# Patient Record
Sex: MALE | Race: WHITE | NOT HISPANIC OR LATINO | Employment: FULL TIME | URBAN - METROPOLITAN AREA
[De-identification: names, ages, dates, MRNs, and addresses within clinical notes are randomized per-mention and may not be internally consistent; named-entity substitution may affect disease eponyms.]

---

## 2021-06-28 DIAGNOSIS — G47.33 OSA (OBSTRUCTIVE SLEEP APNEA): Primary | ICD-10-CM

## 2021-07-05 ENCOUNTER — PROCEDURE VISIT (OUTPATIENT)
Dept: SLEEP MEDICINE | Facility: HOSPITAL | Age: 61
End: 2021-07-05
Attending: INTERNAL MEDICINE
Payer: OTHER GOVERNMENT

## 2021-07-05 DIAGNOSIS — G47.33 OSA (OBSTRUCTIVE SLEEP APNEA): ICD-10-CM

## 2021-07-05 PROCEDURE — 95806 SLEEP STUDY UNATT&RESP EFFT: CPT

## 2021-10-05 ENCOUNTER — PATIENT MESSAGE (OUTPATIENT)
Dept: ADMINISTRATIVE | Facility: HOSPITAL | Age: 61
End: 2021-10-05

## 2021-10-29 ENCOUNTER — OFFICE VISIT (OUTPATIENT)
Dept: FAMILY MEDICINE | Facility: CLINIC | Age: 61
End: 2021-10-29
Payer: COMMERCIAL

## 2021-10-29 VITALS
RESPIRATION RATE: 20 BRPM | DIASTOLIC BLOOD PRESSURE: 84 MMHG | HEART RATE: 78 BPM | BODY MASS INDEX: 31.78 KG/M2 | TEMPERATURE: 98 F | SYSTOLIC BLOOD PRESSURE: 136 MMHG | OXYGEN SATURATION: 99 % | WEIGHT: 227 LBS | HEIGHT: 71 IN

## 2021-10-29 DIAGNOSIS — M25.522 LEFT ELBOW PAIN: ICD-10-CM

## 2021-10-29 DIAGNOSIS — I10 ESSENTIAL HYPERTENSION: Primary | ICD-10-CM

## 2021-10-29 DIAGNOSIS — J30.9 ALLERGIC RHINITIS, UNSPECIFIED SEASONALITY, UNSPECIFIED TRIGGER: ICD-10-CM

## 2021-10-29 DIAGNOSIS — L57.0 ACTINIC KERATOSIS: ICD-10-CM

## 2021-10-29 DIAGNOSIS — G56.22 NEUROPATHY OF LEFT ULNAR NERVE AT WRIST: ICD-10-CM

## 2021-10-29 DIAGNOSIS — H91.90 HEARING LOSS, UNSPECIFIED HEARING LOSS TYPE, UNSPECIFIED LATERALITY: ICD-10-CM

## 2021-10-29 DIAGNOSIS — M19.90 OSTEOARTHRITIS, UNSPECIFIED OSTEOARTHRITIS TYPE, UNSPECIFIED SITE: ICD-10-CM

## 2021-10-29 DIAGNOSIS — K21.9 GASTROESOPHAGEAL REFLUX DISEASE, UNSPECIFIED WHETHER ESOPHAGITIS PRESENT: ICD-10-CM

## 2021-10-29 DIAGNOSIS — M25.561 ARTHRALGIA OF RIGHT KNEE: ICD-10-CM

## 2021-10-29 PROCEDURE — 90472 ZOSTER RECOMBINANT VACCINE: ICD-10-PCS | Mod: S$GLB,,, | Performed by: FAMILY MEDICINE

## 2021-10-29 PROCEDURE — 90471 FLU VACCINE (QUAD) GREATER THAN OR EQUAL TO 3YO PRESERVATIVE FREE IM: ICD-10-PCS | Mod: S$GLB,,, | Performed by: FAMILY MEDICINE

## 2021-10-29 PROCEDURE — 90472 IMMUNIZATION ADMIN EACH ADD: CPT | Mod: S$GLB,,, | Performed by: FAMILY MEDICINE

## 2021-10-29 PROCEDURE — 90750 ZOSTER RECOMBINANT VACCINE: ICD-10-PCS | Mod: S$GLB,,, | Performed by: FAMILY MEDICINE

## 2021-10-29 PROCEDURE — 90750 HZV VACC RECOMBINANT IM: CPT | Mod: S$GLB,,, | Performed by: FAMILY MEDICINE

## 2021-10-29 PROCEDURE — 90471 IMMUNIZATION ADMIN: CPT | Mod: S$GLB,,, | Performed by: FAMILY MEDICINE

## 2021-10-29 PROCEDURE — 90686 FLU VACCINE (QUAD) GREATER THAN OR EQUAL TO 3YO PRESERVATIVE FREE IM: ICD-10-PCS | Mod: S$GLB,,, | Performed by: FAMILY MEDICINE

## 2021-10-29 PROCEDURE — 99214 OFFICE O/P EST MOD 30 MIN: CPT | Mod: 25,S$GLB,, | Performed by: FAMILY MEDICINE

## 2021-10-29 PROCEDURE — 90686 IIV4 VACC NO PRSV 0.5 ML IM: CPT | Mod: S$GLB,,, | Performed by: FAMILY MEDICINE

## 2021-10-29 PROCEDURE — 99214 PR OFFICE/OUTPT VISIT, EST, LEVL IV, 30-39 MIN: ICD-10-PCS | Mod: 25,S$GLB,, | Performed by: FAMILY MEDICINE

## 2021-10-29 RX ORDER — LISINOPRIL 20 MG/1
20 TABLET ORAL DAILY
COMMUNITY
Start: 2021-09-13 | End: 2022-02-11 | Stop reason: SDUPTHER

## 2021-10-29 RX ORDER — FLUTICASONE PROPIONATE 50 MCG
1 SPRAY, SUSPENSION (ML) NASAL DAILY
COMMUNITY
Start: 2021-10-01

## 2021-11-01 PROBLEM — K21.9 GASTROESOPHAGEAL REFLUX DISEASE: Status: ACTIVE | Noted: 2021-11-01

## 2021-11-01 PROBLEM — L57.0 ACTINIC KERATOSIS: Status: ACTIVE | Noted: 2021-11-01

## 2021-11-01 PROBLEM — H91.90 HEARING LOSS: Status: ACTIVE | Noted: 2021-11-01

## 2021-11-01 PROBLEM — M19.90 OSTEOARTHRITIS: Status: ACTIVE | Noted: 2021-11-01

## 2021-11-01 PROBLEM — I10 ESSENTIAL HYPERTENSION: Status: ACTIVE | Noted: 2021-11-01

## 2021-11-05 ENCOUNTER — PATIENT OUTREACH (OUTPATIENT)
Dept: ADMINISTRATIVE | Facility: HOSPITAL | Age: 61
End: 2021-11-05
Payer: OTHER GOVERNMENT

## 2021-11-08 ENCOUNTER — PATIENT OUTREACH (OUTPATIENT)
Dept: ADMINISTRATIVE | Facility: HOSPITAL | Age: 61
End: 2021-11-08
Payer: OTHER GOVERNMENT

## 2022-01-03 ENCOUNTER — PATIENT MESSAGE (OUTPATIENT)
Dept: FAMILY MEDICINE | Facility: CLINIC | Age: 62
End: 2022-01-03
Payer: OTHER GOVERNMENT

## 2022-01-04 PROCEDURE — 90750 HZV VACC RECOMBINANT IM: CPT | Mod: S$GLB,,, | Performed by: FAMILY MEDICINE

## 2022-01-04 PROCEDURE — 90471 ZOSTER RECOMBINANT VACCINE: ICD-10-PCS | Mod: S$GLB,,, | Performed by: FAMILY MEDICINE

## 2022-01-04 PROCEDURE — 90750 ZOSTER RECOMBINANT VACCINE: ICD-10-PCS | Mod: S$GLB,,, | Performed by: FAMILY MEDICINE

## 2022-01-04 PROCEDURE — 90471 IMMUNIZATION ADMIN: CPT | Mod: S$GLB,,, | Performed by: FAMILY MEDICINE

## 2022-02-11 ENCOUNTER — OFFICE VISIT (OUTPATIENT)
Dept: FAMILY MEDICINE | Facility: CLINIC | Age: 62
End: 2022-02-11
Payer: OTHER GOVERNMENT

## 2022-02-11 VITALS
TEMPERATURE: 99 F | HEIGHT: 71 IN | WEIGHT: 236 LBS | BODY MASS INDEX: 33.04 KG/M2 | OXYGEN SATURATION: 95 % | HEART RATE: 68 BPM

## 2022-02-11 DIAGNOSIS — Z13.220 SCREENING CHOLESTEROL LEVEL: ICD-10-CM

## 2022-02-11 DIAGNOSIS — G56.22 NEUROPATHY OF LEFT ULNAR NERVE AT WRIST: ICD-10-CM

## 2022-02-11 DIAGNOSIS — Z72.0 TOBACCO USE: ICD-10-CM

## 2022-02-11 DIAGNOSIS — I10 ESSENTIAL HYPERTENSION: Primary | ICD-10-CM

## 2022-02-11 DIAGNOSIS — K21.9 GASTROESOPHAGEAL REFLUX DISEASE, UNSPECIFIED WHETHER ESOPHAGITIS PRESENT: ICD-10-CM

## 2022-02-11 DIAGNOSIS — Z12.5 SCREENING PSA (PROSTATE SPECIFIC ANTIGEN): ICD-10-CM

## 2022-02-11 DIAGNOSIS — H91.90 HEARING LOSS, UNSPECIFIED HEARING LOSS TYPE, UNSPECIFIED LATERALITY: ICD-10-CM

## 2022-02-11 PROCEDURE — 90732 PPSV23 VACC 2 YRS+ SUBQ/IM: CPT | Mod: S$GLB,,, | Performed by: FAMILY MEDICINE

## 2022-02-11 PROCEDURE — 90732 PNEUMOCOCCAL POLYSACCHARIDE VACCINE 23-VALENT =>2YO SQ IM: ICD-10-PCS | Mod: S$GLB,,, | Performed by: FAMILY MEDICINE

## 2022-02-11 PROCEDURE — 99214 PR OFFICE/OUTPT VISIT, EST, LEVL IV, 30-39 MIN: ICD-10-PCS | Mod: 25,S$GLB,, | Performed by: FAMILY MEDICINE

## 2022-02-11 PROCEDURE — 90471 IMMUNIZATION ADMIN: CPT | Mod: S$GLB,,, | Performed by: FAMILY MEDICINE

## 2022-02-11 PROCEDURE — 90471 PNEUMOCOCCAL POLYSACCHARIDE VACCINE 23-VALENT =>2YO SQ IM: ICD-10-PCS | Mod: S$GLB,,, | Performed by: FAMILY MEDICINE

## 2022-02-11 PROCEDURE — 99214 OFFICE O/P EST MOD 30 MIN: CPT | Mod: 25,S$GLB,, | Performed by: FAMILY MEDICINE

## 2022-02-11 RX ORDER — OMEPRAZOLE 20 MG/1
20 CAPSULE, DELAYED RELEASE ORAL DAILY
COMMUNITY
Start: 2021-08-18 | End: 2022-02-21

## 2022-02-11 RX ORDER — LISINOPRIL 40 MG/1
40 TABLET ORAL DAILY
Qty: 90 TABLET | Refills: 1 | Status: SHIPPED | OUTPATIENT
Start: 2022-02-11 | End: 2022-08-07

## 2022-02-11 RX ORDER — CELECOXIB 100 MG/1
1 CAPSULE ORAL 2 TIMES DAILY
COMMUNITY
Start: 2021-09-07 | End: 2022-02-11

## 2022-02-13 NOTE — PROGRESS NOTES
Not sleeping well.  Blood pressure has been under good control.  But had increased some about 7 days ago.  Using ibuprofen and Tylenol p.m..  Goes to [facility].  gastroesophageal reflux disease medicine p.r.n..  Tobacco use.  Has decreased to 1/2 pack per day now.  Off nonsteroidals.  Left ulnar neuropathy.  Had nerve conduction studies done at the [facility].  left 5th finger stays numb.  Sent him to physical therapy.  Has not really done any good.    Physical examination vital signs noted.  Blood pressure by me 146/72.  Neck without bruit.  Chest clear.  Heart regular rate rhythm.  Abdomen bowel sounds are positive soft and nontender.  Extremities without edema positive pulses.  Paresthesias elder distribution left upper extremity left 5th finger.    Impression.  Tobacco use.  Gastroesophageal reflux disease.  Hypertension.  Insomnia.  Left ulnar neuropathy.    Plan PSA lipid CBC CMP.  Pneumovax 23 administered.  Blood pressure follow-up in 2-3 weeks.  Increase his lisinopril from 20 mg to 40 mg 90 with a refill.  Referred him to orthopedics with the nerve conduction study results to consider having an ulnar release.

## 2022-02-17 LAB
ALBUMIN SERPL-MCNC: 4.4 G/DL (ref 3.6–5.1)
ALBUMIN/GLOB SERPL: 2 (CALC) (ref 1–2.5)
ALP SERPL-CCNC: 60 U/L (ref 35–144)
ALT SERPL-CCNC: 19 U/L (ref 9–46)
AST SERPL-CCNC: 18 U/L (ref 10–35)
BASOPHILS # BLD AUTO: 30 CELLS/UL (ref 0–200)
BASOPHILS NFR BLD AUTO: 0.6 %
BILIRUB SERPL-MCNC: 0.4 MG/DL (ref 0.2–1.2)
BUN SERPL-MCNC: 10 MG/DL (ref 7–25)
BUN/CREAT SERPL: ABNORMAL (CALC) (ref 6–22)
CALCIUM SERPL-MCNC: 9.1 MG/DL (ref 8.6–10.3)
CHLORIDE SERPL-SCNC: 104 MMOL/L (ref 98–110)
CHOLEST SERPL-MCNC: 185 MG/DL
CHOLEST/HDLC SERPL: 4.6 (CALC)
CO2 SERPL-SCNC: 27 MMOL/L (ref 20–32)
CREAT SERPL-MCNC: 0.85 MG/DL (ref 0.7–1.25)
EOSINOPHIL # BLD AUTO: 100 CELLS/UL (ref 15–500)
EOSINOPHIL NFR BLD AUTO: 2 %
ERYTHROCYTE [DISTWIDTH] IN BLOOD BY AUTOMATED COUNT: 12.7 % (ref 11–15)
GLOBULIN SER CALC-MCNC: 2.2 G/DL (CALC) (ref 1.9–3.7)
GLUCOSE SERPL-MCNC: 103 MG/DL (ref 65–99)
HCT VFR BLD AUTO: 41.1 % (ref 38.5–50)
HDLC SERPL-MCNC: 40 MG/DL
HGB BLD-MCNC: 13.6 G/DL (ref 13.2–17.1)
LDLC SERPL CALC-MCNC: 125 MG/DL (CALC)
LYMPHOCYTES # BLD AUTO: 1645 CELLS/UL (ref 850–3900)
LYMPHOCYTES NFR BLD AUTO: 32.9 %
MCH RBC QN AUTO: 30.4 PG (ref 27–33)
MCHC RBC AUTO-ENTMCNC: 33.1 G/DL (ref 32–36)
MCV RBC AUTO: 91.7 FL (ref 80–100)
MONOCYTES # BLD AUTO: 465 CELLS/UL (ref 200–950)
MONOCYTES NFR BLD AUTO: 9.3 %
NEUTROPHILS # BLD AUTO: 2760 CELLS/UL (ref 1500–7800)
NEUTROPHILS NFR BLD AUTO: 55.2 %
NONHDLC SERPL-MCNC: 145 MG/DL (CALC)
PLATELET # BLD AUTO: 247 THOUSAND/UL (ref 140–400)
PMV BLD REES-ECKER: 11 FL (ref 7.5–12.5)
POTASSIUM SERPL-SCNC: 4.8 MMOL/L (ref 3.5–5.3)
PROT SERPL-MCNC: 6.6 G/DL (ref 6.1–8.1)
PSA SERPL-MCNC: 1.19 NG/ML
RBC # BLD AUTO: 4.48 MILLION/UL (ref 4.2–5.8)
SODIUM SERPL-SCNC: 138 MMOL/L (ref 135–146)
TRIGL SERPL-MCNC: 92 MG/DL
WBC # BLD AUTO: 5 THOUSAND/UL (ref 3.8–10.8)

## 2022-02-21 ENCOUNTER — HOSPITAL ENCOUNTER (OUTPATIENT)
Facility: HOSPITAL | Age: 62
Discharge: HOME | End: 2022-02-22
Attending: EMERGENCY MEDICINE | Admitting: INTERNAL MEDICINE
Payer: OTHER GOVERNMENT

## 2022-02-21 DIAGNOSIS — R07.9 CHEST PAIN: Primary | ICD-10-CM

## 2022-02-21 DIAGNOSIS — R07.89 OTHER CHEST PAIN: ICD-10-CM

## 2022-02-21 LAB
ALBUMIN SERPL BCP-MCNC: 4.8 G/DL (ref 3.5–5.2)
ALP SERPL-CCNC: 55 U/L (ref 55–135)
ALT SERPL W/O P-5'-P-CCNC: 26 U/L (ref 10–44)
ANION GAP SERPL CALC-SCNC: 9 MMOL/L (ref 8–16)
AST SERPL-CCNC: 27 U/L (ref 10–40)
BASOPHILS # BLD AUTO: 0.03 K/UL (ref 0–0.2)
BASOPHILS NFR BLD: 0.3 % (ref 0–1.9)
BILIRUB SERPL-MCNC: 0.7 MG/DL (ref 0.1–1)
BUN SERPL-MCNC: 8 MG/DL (ref 6–20)
CALCIUM SERPL-MCNC: 9.7 MG/DL (ref 8.7–10.5)
CHLORIDE SERPL-SCNC: 98 MMOL/L (ref 95–110)
CO2 SERPL-SCNC: 26 MMOL/L (ref 23–29)
CREAT SERPL-MCNC: 0.8 MG/DL (ref 0.5–1.4)
DIFFERENTIAL METHOD: ABNORMAL
EOSINOPHIL # BLD AUTO: 0 K/UL (ref 0–0.5)
EOSINOPHIL NFR BLD: 0.3 % (ref 0–8)
ERYTHROCYTE [DISTWIDTH] IN BLOOD BY AUTOMATED COUNT: 13 % (ref 11.5–14.5)
EST. GFR  (AFRICAN AMERICAN): >60 ML/MIN/1.73 M^2
EST. GFR  (NON AFRICAN AMERICAN): >60 ML/MIN/1.73 M^2
GLUCOSE SERPL-MCNC: 104 MG/DL (ref 70–110)
HCT VFR BLD AUTO: 40.8 % (ref 40–54)
HGB BLD-MCNC: 13.9 G/DL (ref 14–18)
IMM GRANULOCYTES # BLD AUTO: 0.02 K/UL (ref 0–0.04)
IMM GRANULOCYTES NFR BLD AUTO: 0.2 % (ref 0–0.5)
LYMPHOCYTES # BLD AUTO: 2.1 K/UL (ref 1–4.8)
LYMPHOCYTES NFR BLD: 19.9 % (ref 18–48)
MCH RBC QN AUTO: 30 PG (ref 27–31)
MCHC RBC AUTO-ENTMCNC: 34.1 G/DL (ref 32–36)
MCV RBC AUTO: 88 FL (ref 82–98)
MONOCYTES # BLD AUTO: 0.7 K/UL (ref 0.3–1)
MONOCYTES NFR BLD: 6.3 % (ref 4–15)
NEUTROPHILS # BLD AUTO: 7.6 K/UL (ref 1.8–7.7)
NEUTROPHILS NFR BLD: 73 % (ref 38–73)
NRBC BLD-RTO: 0 /100 WBC
PLATELET # BLD AUTO: 265 K/UL (ref 150–450)
PMV BLD AUTO: 10.2 FL (ref 9.2–12.9)
POTASSIUM SERPL-SCNC: 4.1 MMOL/L (ref 3.5–5.1)
PROT SERPL-MCNC: 7.6 G/DL (ref 6–8.4)
RBC # BLD AUTO: 4.63 M/UL (ref 4.6–6.2)
SARS-COV-2 RDRP RESP QL NAA+PROBE: NEGATIVE
SODIUM SERPL-SCNC: 133 MMOL/L (ref 136–145)
TROPONIN I SERPL DL<=0.01 NG/ML-MCNC: <0.03 NG/ML
TROPONIN I SERPL DL<=0.01 NG/ML-MCNC: <0.03 NG/ML
WBC # BLD AUTO: 10.43 K/UL (ref 3.9–12.7)

## 2022-02-21 PROCEDURE — 85025 COMPLETE CBC W/AUTO DIFF WBC: CPT | Performed by: NURSE PRACTITIONER

## 2022-02-21 PROCEDURE — 25000003 PHARM REV CODE 250: Performed by: INTERNAL MEDICINE

## 2022-02-21 PROCEDURE — 84484 ASSAY OF TROPONIN QUANT: CPT | Performed by: NURSE PRACTITIONER

## 2022-02-21 PROCEDURE — 93005 ELECTROCARDIOGRAM TRACING: CPT | Performed by: GENERAL PRACTICE

## 2022-02-21 PROCEDURE — 63600175 PHARM REV CODE 636 W HCPCS: Performed by: INTERNAL MEDICINE

## 2022-02-21 PROCEDURE — 80053 COMPREHEN METABOLIC PANEL: CPT | Performed by: NURSE PRACTITIONER

## 2022-02-21 PROCEDURE — 93010 EKG 12-LEAD: ICD-10-PCS | Mod: ,,, | Performed by: GENERAL PRACTICE

## 2022-02-21 PROCEDURE — G0378 HOSPITAL OBSERVATION PER HR: HCPCS

## 2022-02-21 PROCEDURE — 25000003 PHARM REV CODE 250: Performed by: STUDENT IN AN ORGANIZED HEALTH CARE EDUCATION/TRAINING PROGRAM

## 2022-02-21 PROCEDURE — 96372 THER/PROPH/DIAG INJ SC/IM: CPT | Performed by: INTERNAL MEDICINE

## 2022-02-21 PROCEDURE — 99285 EMERGENCY DEPT VISIT HI MDM: CPT | Mod: 25

## 2022-02-21 PROCEDURE — U0002 COVID-19 LAB TEST NON-CDC: HCPCS | Performed by: STUDENT IN AN ORGANIZED HEALTH CARE EDUCATION/TRAINING PROGRAM

## 2022-02-21 PROCEDURE — 84484 ASSAY OF TROPONIN QUANT: CPT | Mod: 91 | Performed by: INTERNAL MEDICINE

## 2022-02-21 PROCEDURE — 93010 ELECTROCARDIOGRAM REPORT: CPT | Mod: ,,, | Performed by: GENERAL PRACTICE

## 2022-02-21 RX ORDER — FLUTICASONE PROPIONATE 50 MCG
1 SPRAY, SUSPENSION (ML) NASAL DAILY
Status: DISCONTINUED | OUTPATIENT
Start: 2022-02-22 | End: 2022-02-22 | Stop reason: HOSPADM

## 2022-02-21 RX ORDER — TALC
6 POWDER (GRAM) TOPICAL NIGHTLY PRN
Status: DISCONTINUED | OUTPATIENT
Start: 2022-02-21 | End: 2022-02-22 | Stop reason: HOSPADM

## 2022-02-21 RX ORDER — ASPIRIN 81 MG/1
81 TABLET ORAL DAILY
COMMUNITY
End: 2023-05-16

## 2022-02-21 RX ORDER — ASPIRIN 81 MG/1
81 TABLET ORAL DAILY
Status: DISCONTINUED | OUTPATIENT
Start: 2022-02-22 | End: 2022-02-22 | Stop reason: HOSPADM

## 2022-02-21 RX ORDER — ONDANSETRON 2 MG/ML
4 INJECTION INTRAMUSCULAR; INTRAVENOUS EVERY 8 HOURS PRN
Status: DISCONTINUED | OUTPATIENT
Start: 2022-02-21 | End: 2022-02-22 | Stop reason: HOSPADM

## 2022-02-21 RX ORDER — ENOXAPARIN SODIUM 100 MG/ML
40 INJECTION SUBCUTANEOUS EVERY 24 HOURS
Status: DISCONTINUED | OUTPATIENT
Start: 2022-02-21 | End: 2022-02-22 | Stop reason: HOSPADM

## 2022-02-21 RX ORDER — LISINOPRIL 20 MG/1
40 TABLET ORAL DAILY
Status: DISCONTINUED | OUTPATIENT
Start: 2022-02-22 | End: 2022-02-22 | Stop reason: HOSPADM

## 2022-02-21 RX ORDER — ACETAMINOPHEN 325 MG/1
650 TABLET ORAL EVERY 8 HOURS PRN
Status: DISCONTINUED | OUTPATIENT
Start: 2022-02-21 | End: 2022-02-22 | Stop reason: HOSPADM

## 2022-02-21 RX ORDER — ASPIRIN 325 MG
325 TABLET ORAL
Status: COMPLETED | OUTPATIENT
Start: 2022-02-21 | End: 2022-02-21

## 2022-02-21 RX ADMIN — MELATONIN TAB 3 MG 6 MG: 3 TAB at 10:02

## 2022-02-21 RX ADMIN — ASPIRIN 325 MG ORAL TABLET 325 MG: 325 PILL ORAL at 08:02

## 2022-02-21 RX ADMIN — ENOXAPARIN SODIUM 40 MG: 40 INJECTION SUBCUTANEOUS at 09:02

## 2022-02-21 NOTE — FIRST PROVIDER EVALUATION
" Emergency Department TeleTriage Encounter Note      CHIEF COMPLAINT    Chief Complaint   Patient presents with    Chest Pain       VITAL SIGNS   Initial Vitals [02/21/22 1623]   BP Pulse Resp Temp SpO2   (!) 155/87 94 17 98.8 °F (37.1 °C) 95 %      MAP       --            ALLERGIES    Review of patient's allergies indicates:   Allergen Reactions    Codeine     Sulfa (sulfonamide antibiotics)     Zonisamide        PROVIDER TRIAGE NOTE  This is a teletriage evaluation of a 61 y.o. male presenting to the ED complaining of chest "tightness" and elevated HR earlier today after walking.  Pt denies current CP.     Initial orders will be placed and care will be transferred to an alternate provider when patient is roomed for a full evaluation. Any additional orders and the final disposition will be determined by that provider.           ORDERS  Labs Reviewed - No data to display    ED Orders (720h ago, onward)    Start Ordered     Status Ordering Provider    02/21/22 1748 02/21/22 1748  Vital signs  Every 15 min         Ordered PARTHA NASCIMENTO N.    02/21/22 1748 02/21/22 1748  Cardiac Monitoring - Adult  Continuous        Comments: Notify Physician If:    Ordered PARTHA NASCIMENTO N.    02/21/22 1748 02/21/22 1748  Pulse Oximetry Continuous  Continuous         Ordered PARTHA NASCIMENTO N.    02/21/22 1748 02/21/22 1748  Diet NPO  Diet effective now         Ordered PARTHA NASCIMENTO N.    02/21/22 1748 02/21/22 1748  Saline lock IV  Once         Ordered PARTHA NASCIMENTO N.    02/21/22 1748 02/21/22 1748  EKG 12-lead  Once        Comments: Do not perform if previously done during this visit/ triage    Ordered PARTHA NASCIMENTO N.    02/21/22 1748 02/21/22 1748  CBC auto differential  STAT         Ordered PARTHA NASCIMENTO N.    02/21/22 1748 02/21/22 1748  Comprehensive metabolic panel  STAT         Ordered PARTHA NASCIMENTO N.    02/21/22 1748 02/21/22 1748  Troponin I #1  STAT "         Ordered PARTHA NASCIMENTO N.    02/21/22 1748 02/21/22 1748  X-Ray Chest AP Portable  1 time imaging         Ordered PARTHA NASCIMENTO N.    02/21/22 1624 02/21/22 1623  EKG 12-lead  Once         Completed by BEAU BAILEY on 2/21/2022 at  4:32 PM EVIE GREGORY            Virtual Visit Note: The provider triage portion of this emergency department evaluation and documentation was performed via [email or web address], a HIPAA-compliant telemedicine application, in concert with a tele-presenter in the room. A face to face patient evaluation with one of my colleagues will occur once the patient is placed in an emergency department room.      DISCLAIMER: This note was prepared with [employer] voice recognition transcription software. Garbled syntax, mangled pronouns, and other bizarre constructions may be attributed to that software system.

## 2022-02-22 ENCOUNTER — CLINICAL SUPPORT (OUTPATIENT)
Dept: CARDIOLOGY | Facility: HOSPITAL | Age: 62
End: 2022-02-22
Attending: INTERNAL MEDICINE
Payer: OTHER GOVERNMENT

## 2022-02-22 ENCOUNTER — HOSPITAL ENCOUNTER (OUTPATIENT)
Dept: RADIOLOGY | Facility: HOSPITAL | Age: 62
Discharge: HOME | End: 2022-02-22
Attending: INTERNAL MEDICINE
Payer: OTHER GOVERNMENT

## 2022-02-22 VITALS
TEMPERATURE: 98 F | SYSTOLIC BLOOD PRESSURE: 150 MMHG | RESPIRATION RATE: 12 BRPM | OXYGEN SATURATION: 97 % | HEIGHT: 70 IN | WEIGHT: 225 LBS | HEART RATE: 65 BPM | BODY MASS INDEX: 32.21 KG/M2 | DIASTOLIC BLOOD PRESSURE: 72 MMHG

## 2022-02-22 LAB
ANION GAP SERPL CALC-SCNC: 9 MMOL/L (ref 8–16)
BASOPHILS # BLD AUTO: 0.03 K/UL (ref 0–0.2)
BASOPHILS NFR BLD: 0.5 % (ref 0–1.9)
BUN SERPL-MCNC: 9 MG/DL (ref 6–20)
CALCIUM SERPL-MCNC: 9.2 MG/DL (ref 8.7–10.5)
CHLORIDE SERPL-SCNC: 102 MMOL/L (ref 95–110)
CO2 SERPL-SCNC: 27 MMOL/L (ref 23–29)
CREAT SERPL-MCNC: 0.8 MG/DL (ref 0.5–1.4)
CV STRESS BASE HR: 70 BPM
DIASTOLIC BLOOD PRESSURE: 84 MMHG
DIFFERENTIAL METHOD: ABNORMAL
EOSINOPHIL # BLD AUTO: 0.1 K/UL (ref 0–0.5)
EOSINOPHIL NFR BLD: 1.1 % (ref 0–8)
ERYTHROCYTE [DISTWIDTH] IN BLOOD BY AUTOMATED COUNT: 13 % (ref 11.5–14.5)
EST. GFR  (AFRICAN AMERICAN): >60 ML/MIN/1.73 M^2
EST. GFR  (NON AFRICAN AMERICAN): >60 ML/MIN/1.73 M^2
GLUCOSE SERPL-MCNC: 105 MG/DL (ref 70–110)
HCT VFR BLD AUTO: 38.8 % (ref 40–54)
HGB BLD-MCNC: 12.9 G/DL (ref 14–18)
IMM GRANULOCYTES # BLD AUTO: 0.01 K/UL (ref 0–0.04)
IMM GRANULOCYTES NFR BLD AUTO: 0.2 % (ref 0–0.5)
LYMPHOCYTES # BLD AUTO: 2.3 K/UL (ref 1–4.8)
LYMPHOCYTES NFR BLD: 36.7 % (ref 18–48)
MCH RBC QN AUTO: 30.4 PG (ref 27–31)
MCHC RBC AUTO-ENTMCNC: 33.2 G/DL (ref 32–36)
MCV RBC AUTO: 92 FL (ref 82–98)
MONOCYTES # BLD AUTO: 0.6 K/UL (ref 0.3–1)
MONOCYTES NFR BLD: 9.9 % (ref 4–15)
NEUTROPHILS # BLD AUTO: 3.2 K/UL (ref 1.8–7.7)
NEUTROPHILS NFR BLD: 51.6 % (ref 38–73)
NRBC BLD-RTO: 0 /100 WBC
OHS CV CPX 1 MINUTE RECOVERY HEART RATE: 114 BPM
OHS CV CPX 85 PERCENT MAX PREDICTED HEART RATE MALE: 136
OHS CV CPX MAX PREDICTED HEART RATE: 160
OHS CV CPX PATIENT IS FEMALE: 0
OHS CV CPX PATIENT IS MALE: 1
OHS CV CPX PEAK DIASTOLIC BLOOD PRESSURE: 58 MMHG
OHS CV CPX PEAK HEAR RATE: 115 BPM
OHS CV CPX PEAK RATE PRESSURE PRODUCT: NORMAL
OHS CV CPX PEAK SYSTOLIC BLOOD PRESSURE: 199 MMHG
OHS CV CPX PERCENT MAX PREDICTED HEART RATE ACHIEVED: 72
OHS CV CPX RATE PRESSURE PRODUCT PRESENTING: NORMAL
PLATELET # BLD AUTO: 221 K/UL (ref 150–450)
PMV BLD AUTO: 10.6 FL (ref 9.2–12.9)
POTASSIUM SERPL-SCNC: 4 MMOL/L (ref 3.5–5.1)
RBC # BLD AUTO: 4.24 M/UL (ref 4.6–6.2)
SODIUM SERPL-SCNC: 138 MMOL/L (ref 136–145)
SYSTOLIC BLOOD PRESSURE: 163 MMHG
TROPONIN I SERPL DL<=0.01 NG/ML-MCNC: <0.03 NG/ML
WBC # BLD AUTO: 6.15 K/UL (ref 3.9–12.7)

## 2022-02-22 PROCEDURE — 80048 BASIC METABOLIC PNL TOTAL CA: CPT | Performed by: INTERNAL MEDICINE

## 2022-02-22 PROCEDURE — 93018 NUCLEAR STRESS TEST (CUPID ONLY): ICD-10-PCS | Mod: ,,, | Performed by: INTERNAL MEDICINE

## 2022-02-22 PROCEDURE — 25000003 PHARM REV CODE 250: Performed by: INTERNAL MEDICINE

## 2022-02-22 PROCEDURE — 63600175 PHARM REV CODE 636 W HCPCS: Performed by: INTERNAL MEDICINE

## 2022-02-22 PROCEDURE — G0378 HOSPITAL OBSERVATION PER HR: HCPCS

## 2022-02-22 PROCEDURE — 84484 ASSAY OF TROPONIN QUANT: CPT | Performed by: INTERNAL MEDICINE

## 2022-02-22 PROCEDURE — A9502 TC99M TETROFOSMIN: HCPCS

## 2022-02-22 PROCEDURE — 93017 CV STRESS TEST TRACING ONLY: CPT

## 2022-02-22 PROCEDURE — 85025 COMPLETE CBC W/AUTO DIFF WBC: CPT | Performed by: INTERNAL MEDICINE

## 2022-02-22 PROCEDURE — 93018 CV STRESS TEST I&R ONLY: CPT | Mod: ,,, | Performed by: INTERNAL MEDICINE

## 2022-02-22 PROCEDURE — 93016 CV STRESS TEST SUPVJ ONLY: CPT | Mod: ,,, | Performed by: INTERNAL MEDICINE

## 2022-02-22 PROCEDURE — 93016 NUCLEAR STRESS TEST (CUPID ONLY): ICD-10-PCS | Mod: ,,, | Performed by: INTERNAL MEDICINE

## 2022-02-22 RX ORDER — REGADENOSON 0.08 MG/ML
0.4 INJECTION, SOLUTION INTRAVENOUS
Status: COMPLETED | OUTPATIENT
Start: 2022-02-22 | End: 2022-02-22

## 2022-02-22 RX ADMIN — REGADENOSON 0.4 MG: 0.08 INJECTION, SOLUTION INTRAVENOUS at 12:02

## 2022-02-22 RX ADMIN — ASPIRIN 81 MG: 81 TABLET, DELAYED RELEASE ORAL at 09:02

## 2022-02-22 RX ADMIN — LISINOPRIL 40 MG: 20 TABLET ORAL at 09:02

## 2022-02-22 RX ADMIN — THERA TABS 1 TABLET: TAB at 09:02

## 2022-02-22 NOTE — HOSPITAL COURSE
2/22 Patient without further CP , negative cardiac enzymes and neg myoview . He appears stable for discharge   F/U with Dr Oviedo 3- 5 days

## 2022-02-22 NOTE — ED PROVIDER NOTES
Encounter Date: 2/21/2022       History     Chief Complaint   Patient presents with    Chest Pain     HPI     Patient is a 61-year-old male with history of hypertension and tobacco use presenting with 3 hours of chest pressure and palpitations.  Patient states he went for a 2 mile walk and after completing his walker rather than the feeling of high blood pressure and fast heart rate improving with rest the symptoms continued.  Patient reports taking his blood pressure at home for the next couple hours and reporting systolic pressures in the 170s which is unusual for him.  Patient also reported a slight pressure in his heart and a fast heartbeat.  Patient denies any associated nausea, vomiting, diaphoresis.  The chest pressure sensation was left-sided without radiation to the jaw or extremities.  Patient has never had symptoms like this before.  Patient does smoke cigarettes and currently smokes a half pack a day.  Patient does take a daily aspirin.  Patient has a brother who had a heart attack at age 65. Patient denies any unilateral leg swelling or hemoptysis.  Patient denies any fever or chills.  This chest pain sensation lasted about 3 hours and self-resolved shortly after arrival to the ED.  the    Review of patient's allergies indicates:   Allergen Reactions    Codeine     Sulfa (sulfonamide antibiotics)     Zonisamide      Past Medical History:   Diagnosis Date    Hypertension      No past surgical history on file.  No family history on file.  Social History     Tobacco Use    Smoking status: Current Every Day Smoker    Smokeless tobacco: Never Used   Substance Use Topics    Alcohol use: Yes     Comment: SOCIAL    Drug use: Not Currently     Review of Systems   Constitutional: Negative for activity change, appetite change, chills, fatigue and fever.   HENT: Negative for congestion and rhinorrhea.    Respiratory: Positive for chest tightness. Negative for cough, shortness of breath and wheezing.     Cardiovascular: Positive for chest pain and palpitations. Negative for leg swelling.   Gastrointestinal: Negative for abdominal pain, nausea and vomiting.   Genitourinary: Negative for dysuria.   Skin: Negative for rash and wound.   Neurological: Negative for syncope, light-headedness, numbness and headaches.   Psychiatric/Behavioral: Negative for confusion.       Physical Exam     Initial Vitals [02/21/22 1623]   BP Pulse Resp Temp SpO2   (!) 155/87 94 17 98.8 °F (37.1 °C) 95 %      MAP       --         Physical Exam    Constitutional: He appears well-developed and well-nourished.   HENT:   Head: Normocephalic and atraumatic.   Eyes: Conjunctivae are normal.   Neck:   Normal range of motion.  Cardiovascular: Normal rate.   No murmur heard.  Pulmonary/Chest: Breath sounds normal. No respiratory distress. He has no wheezes. He has no rhonchi. He has no rales. He exhibits no tenderness.   Abdominal: Abdomen is soft. He exhibits no distension. There is no abdominal tenderness.   Musculoskeletal:         General: No edema. Normal range of motion.      Cervical back: Normal range of motion.     Neurological: He is alert and oriented to person, place, and time.         ED Course   Procedures  Labs Reviewed   CBC W/ AUTO DIFFERENTIAL - Abnormal; Notable for the following components:       Result Value    Hemoglobin 13.9 (*)     All other components within normal limits   COMPREHENSIVE METABOLIC PANEL - Abnormal; Notable for the following components:    Sodium 133 (*)     All other components within normal limits   TROPONIN I   TROPONIN I   SARS-COV-2 RNA AMPLIFICATION, QUAL          Imaging Results          X-Ray Chest PA And Lateral (Final result)  Result time 02/21/22 18:27:28   Procedure changed from X-Ray Chest AP Portable     Final result by Yuniel Gonzalez MD (02/21/22 18:27:28)                 Impression:      No acute cardiopulmonary abnormality.      Electronically signed by: Yuniel Gonzalez  MD  Date:    02/21/2022  Time:    18:27             Narrative:    EXAMINATION:  XR CHEST PA AND LATERAL    CLINICAL HISTORY:  Chest Pain;    FINDINGS:  PA and lateral chest compared with 01/28/2016 shows normal cardiomediastinal silhouette.    Bilateral calcified granuloma unchanged.  Lungs are otherwise clear.  Pulmonary vasculature is normal. No acute osseous abnormality.                                 Medications   aspirin tablet 325 mg (has no administration in time range)     Medical Decision Making:   Initial Assessment:   61-year-old male with history of hypertension, obesity, tobacco use presenting with 3 hours of left-sided chest pressure after 2 mi walk.  Patient with elevated blood pressure here but otherwise stable.  On exam no cardiac murmur, extremities warm with equal pulses, lungs clear.  Differential Diagnosis:   ACS, unstable angina, palpitations, anxiety, arrhythmia, gastritis/GERD, costochondritis, PE  Clinical Tests:   Lab Tests: Ordered  Radiological Study: Ordered and Reviewed  ED Management:  Will give patient 325 of aspirin.  Given patient's risk factors, age, in concerning story, feel patient would benefit from observation with trending of troponins and further workup for chest discomfort.  Heart score 4. Will consult Hospital Medicine for admission.    Adán Burk MD  Internal/Emergency Medicine, PGY-IV                        Clinical Impression:   Final diagnoses:  [R07.9] Chest pain          ED Disposition Condition    Admit               Adán Burk MD  Resident  02/21/22 1950

## 2022-02-22 NOTE — DISCHARGE SUMMARY
[facility] - Emergency Dept  Hospital Medicine  Discharge Summary      Patient Name: Isai Mello  MRN: [id number]  Patient Class: OP- Observation  Admission Date: 2/21/2022  Hospital Length of Stay: 0 days  Discharge Date and Time:  02/22/2022 2:31 PM  Attending Physician: No att. providers found   Discharging Provider: Norman Umanzor DO  Primary Care Provider: Brian Oviedo III, MD      HPI:   61 year old male with history of HTN and has been vaccinated against COVID presented to ED complaining of central chest aching [place]. He came home and his pressure was in the 170 range. Last PM he had trouble sleeping through the night and feels that that was a contributing reason for the elevated pressure. His PCP recently doubled his lisinopril dose. Chest aching was central radiation towards left shoulder. No SOB. Ho history of orthopnea, PND or edema. No cough or sputum.     In the ED Labs reviewed and noted below: normal CBC; trivial hyponatremia with normal renal and hepatic function; troponin is normal. CXR: NAPD. EKG: sinus without acute segments      * No surgery found *      Hospital Course:   2/22 Patient without further CP , negative cardiac enzymes and neg myoview . He appears stable for discharge   F/U with Dr Oviedo 3- 5 days        Goals of Care Treatment Preferences:  Code Status: Full Code      Consults:   Consults (From admission, onward)        Status Ordering Provider     Inpatient consult to Hospitalist  Once        Provider:  Stan Moscoso MD    Acknowledged STAN MOSCOSO          No new Assessment & Plan notes have been filed under this hospital service since the last note was generated.  Service: Hospital Medicine    Final Active Diagnoses:    Diagnosis Date Noted POA    PRINCIPAL PROBLEM:  Other chest pain [R07.89] 02/21/2022 Yes      Problems Resolved During this Admission:       Discharged Condition: good  Lungs CTA  Heart RRR  Neuro  AAOx3 non focal     Disposition: Home or Self Care    Follow Up:   Follow-up Information     Brian Oviedo III, MD Follow up in 1 week(s).    Specialty: Family Medicine  Contact information:  [address] [facility]  [address]  [facility] [address] [phone]                       Patient Instructions:      Diet Cardiac     Activity as tolerated       Significant Diagnostic Studies: Labs:   BMP:   Recent Labs   Lab 02/21/22  1801 02/22/22  0526    105   * 138   K 4.1 4.0   CL 98 102   CO2 26 27   BUN 8 9   CREATININE 0.8 0.8   CALCIUM 9.7 9.2   , CMP   Recent Labs   Lab 02/21/22  1801 02/22/22  0526   * 138   K 4.1 4.0   CL 98 102   CO2 26 27    105   BUN 8 9   CREATININE 0.8 0.8   CALCIUM 9.7 9.2   PROT 7.6  --    ALBUMIN 4.8  --    BILITOT 0.7  --    ALKPHOS 55  --    AST 27  --    ALT 26  --    ANIONGAP 9 9   ESTGFRAFRICA >60.0 >60.0   EGFRNONAA >60.0 >60.0   , CBC   Recent Labs   Lab 02/21/22  1801 02/22/22  0526   WBC 10.43 6.15   HGB 13.9* 12.9*   HCT 40.8 38.8*    221   , Lipid Panel   Lab Results   Component Value Date    CHOL 185 02/16/2022    HDL 40 02/16/2022    LDLCALC 125 (H) 02/16/2022    TRIG 92 02/16/2022    CHOLHDL 4.6 02/16/2022    and Troponin   Recent Labs   Lab 02/21/22  1801 02/21/22  2139 02/22/22  0526   TROPONINI <0.030 <0.030 <0.030       Pending Diagnostic Studies:     Procedure Component Value Units Date/Time    Troponin I [[id number]] Collected: 02/21/22 1958    Order Status: Sent Lab Status: In process Updated: 02/21/22 1958    Specimen: Blood          Medications:  Reconciled Home Medications:      Medication List      CONTINUE taking these medications    aspirin 81 MG EC tablet  Commonly known as: ECOTRIN  Take 81 mg by mouth once daily.     coenzyme Q10 200 mg capsule  Take 200 mg by mouth once daily.     fish oil-omega-3 fatty acids 300-1,000 mg capsule  Take 1 capsule by mouth once daily.     fluticasone propionate 50 mcg/actuation nasal spray  Commonly  known as: FLONASE  1 spray by Each Nostril route once daily.     lisinopriL 40 MG tablet  Commonly known as: PRINIVIL,ZESTRIL  Take 1 tablet (40 mg total) by mouth once daily.     multivitamin per tablet  Commonly known as: THERAGRAN  Take 1 tablet by mouth once daily.     PRESERVISION AREDS ORAL  Take 1 tablet by mouth once daily.        STOP taking these medications    omeprazole 20 MG capsule  Commonly known as: PRILOSEC          I did not discontinue the prilosec as listed above       Indwelling Lines/Drains at time of discharge:   Lines/Drains/Airways     None                 Time spent on the discharge of patient: 23 minutes         Norman Umanzor DO  Department of Hospital Medicine  [facility] - Emergency Dept

## 2022-02-22 NOTE — SUBJECTIVE & OBJECTIVE
Past Medical History:   Diagnosis Date    Hypertension        No past surgical history on file.    Review of patient's allergies indicates:   Allergen Reactions    Codeine     Sulfa (sulfonamide antibiotics)     Zonisamide        No current facility-administered medications on file prior to encounter.     Current Outpatient Medications on File Prior to Encounter   Medication Sig    aspirin (ECOTRIN) 81 MG EC tablet Take 81 mg by mouth once daily.    coenzyme Q10 200 mg capsule Take 200 mg by mouth once daily.    fluticasone propionate (FLONASE) 50 mcg/actuation nasal spray 1 spray by Each Nostril route once daily.    lisinopriL (PRINIVIL,ZESTRIL) 40 MG tablet Take 1 tablet (40 mg total) by mouth once daily.    multivitamin (THERAGRAN) per tablet Take 1 tablet by mouth once daily.    omega-3 fatty acids/fish oil (FISH OIL-OMEGA-3 FATTY ACIDS) 300-1,000 mg capsule Take 1 capsule by mouth once daily.    vit A/vit C/vit E/zinc/copper (PRESERVISION AREDS ORAL) Take 1 tablet by mouth once daily.    [DISCONTINUED] omeprazole (PRILOSEC) 20 MG capsule Take 20 mg by mouth once daily.     Family History    None       Tobacco Use    Smoking status: Current Every Day Smoker    Smokeless tobacco: Never Used   Substance and Sexual Activity    Alcohol use: Yes     Comment: SOCIAL    Drug use: Not Currently    Sexual activity: Not Currently     Review of Systems   Constitutional: Negative.    HENT: Negative.     Eyes: Negative.    Respiratory: Negative.     Cardiovascular:  Positive for chest pain.   Gastrointestinal: Negative.    Endocrine: Negative.    Genitourinary: Negative.    Musculoskeletal: Negative.    Skin: Negative.    Allergic/Immunologic: Negative.    Neurological: Negative.    Hematological: Negative.    All other systems reviewed and are negative.  Objective:     Vital Signs (Most Recent):  Temp: 98.2 °F (36.8 °C) (02/21/22 1957)  Pulse: 68 (02/21/22 2100)  Resp: 20 (02/21/22 2100)  BP: (!) 150/77 (02/21/22  2100)  SpO2: 99 % (02/21/22 2100)   Vital Signs (24h Range):  Temp:  [98.2 °F (36.8 °C)-98.8 °F (37.1 °C)] 98.2 °F (36.8 °C)  Pulse:  [68-94] 68  Resp:  [17-20] 20  SpO2:  [95 %-100 %] 99 %  BP: (150-196)/(77-92) 150/77     Weight: 102.1 kg (225 lb)  Body mass index is 31.38 kg/m².    Physical Exam  Vitals and nursing note reviewed.   Constitutional:       Appearance: He is well-developed.   HENT:      Head: Normocephalic and atraumatic.      Right Ear: External ear normal.      Left Ear: External ear normal.      Nose: Nose normal.   Eyes:      Conjunctiva/sclera: Conjunctivae normal.      Pupils: Pupils are equal, round, and reactive to light.   Cardiovascular:      Rate and Rhythm: Normal rate and regular rhythm.      Heart sounds: Normal heart sounds.   Pulmonary:      Effort: Pulmonary effort is normal.      Breath sounds: Normal breath sounds.   Abdominal:      General: Bowel sounds are normal.      Palpations: Abdomen is soft.   Musculoskeletal:         General: Normal range of motion.      Cervical back: Normal range of motion and neck supple.   Skin:     General: Skin is warm and dry.      Capillary Refill: Capillary refill takes less than 2 seconds.   Neurological:      Mental Status: He is alert and oriented to person, place, and time.   Psychiatric:         Behavior: Behavior normal.         Thought Content: Thought content normal.         Judgment: Judgment normal.         CRANIAL NERVES     CN III, IV, VI   Pupils are equal, round, and reactive to light.     Significant Labs: All pertinent labs within the past 24 hours have been reviewed.  CBC:   Recent Labs   Lab 02/21/22  1801   WBC 10.43   HGB 13.9*   HCT 40.8        CMP:   Recent Labs   Lab 02/21/22  1801   *   K 4.1   CL 98   CO2 26      BUN 8   CREATININE 0.8   CALCIUM 9.7   PROT 7.6   ALBUMIN 4.8   BILITOT 0.7   ALKPHOS 55   AST 27   ALT 26   ANIONGAP 9   EGFRNONAA >60.0     Cardiac Markers: No results for input(s): CKMB,  MYOGLOBIN, BNP, TROPISTAT in the last 48 hours.  Troponin:   Recent Labs   Lab 02/21/22  1801   TROPONINI <0.030       Significant Imaging: I have reviewed all pertinent imaging results/findings within the past 24 hours.

## 2022-02-22 NOTE — HPI
61 year old male with history of HTN and has been vaccinated against COVID presented to ED complaining of central chest aching [place]. He came home and his pressure was in the 170 range. Last PM he had trouble sleeping through the night and feels that that was a contributing reason for the elevated pressure. His PCP recently doubled his lisinopril dose. Chest aching was central radiation towards left shoulder. No SOB. Ho history of orthopnea, PND or edema. No cough or sputum.     In the ED Labs reviewed and noted below: normal CBC; trivial hyponatremia with normal renal and hepatic function; troponin is normal. CXR: NAPD. EKG: sinus without acute segments

## 2022-02-22 NOTE — H&P
[facility] - Emergency Dept  Hospital Medicine  History & Physical    Patient Name: Isai Mello  MRN: [id number]  Patient Class: OP- Observation  Admission Date: 2/21/2022  Attending Physician: Prashanth Moscoso MD  Primary Care Provider: Brian Oviedo III, MD         Patient information was obtained from patient, relative(s) and ER records.     Subjective:     Principal Problem:Other chest pain    Chief Complaint:   Chief Complaint   Patient presents with    Chest Pain        HPI: 61 year old male with history of HTN and has been vaccinated against COVID presented to ED complaining of central chest aching [place]. He came home and his pressure was in the 170 range. Last PM he had trouble sleeping through the night and feels that that was a contributing reason for the elevated pressure. His PCP recently doubled his lisinopril dose. Chest aching was central radiation towards left shoulder. No SOB. Ho history of orthopnea, PND or edema. No cough or sputum.     In the ED Labs reviewed and noted below: normal CBC; trivial hyponatremia with normal renal and hepatic function; troponin is normal. CXR: NAPD. EKG: sinus without acute segments      Past Medical History:   Diagnosis Date    Hypertension        No past surgical history on file.    Review of patient's allergies indicates:   Allergen Reactions    Codeine     Sulfa (sulfonamide antibiotics)     Zonisamide        No current facility-administered medications on file prior to encounter.     Current Outpatient Medications on File Prior to Encounter   Medication Sig    aspirin (ECOTRIN) 81 MG EC tablet Take 81 mg by mouth once daily.    coenzyme Q10 200 mg capsule Take 200 mg by mouth once daily.    fluticasone propionate (FLONASE) 50 mcg/actuation nasal spray 1 spray by Each Nostril route once daily.    lisinopriL (PRINIVIL,ZESTRIL) 40 MG tablet Take 1 tablet (40 mg total) by mouth once daily.     multivitamin (THERAGRAN) per tablet Take 1 tablet by mouth once daily.    omega-3 fatty acids/fish oil (FISH OIL-OMEGA-3 FATTY ACIDS) 300-1,000 mg capsule Take 1 capsule by mouth once daily.    vit A/vit C/vit E/zinc/copper (PRESERVISION AREDS ORAL) Take 1 tablet by mouth once daily.    [DISCONTINUED] omeprazole (PRILOSEC) 20 MG capsule Take 20 mg by mouth once daily.     Family History    None       Tobacco Use    Smoking status: Current Every Day Smoker    Smokeless tobacco: Never Used   Substance and Sexual Activity    Alcohol use: Yes     Comment: SOCIAL    Drug use: Not Currently    Sexual activity: Not Currently     Review of Systems   Constitutional: Negative.    HENT: Negative.     Eyes: Negative.    Respiratory: Negative.     Cardiovascular:  Positive for chest pain.   Gastrointestinal: Negative.    Endocrine: Negative.    Genitourinary: Negative.    Musculoskeletal: Negative.    Skin: Negative.    Allergic/Immunologic: Negative.    Neurological: Negative.    Hematological: Negative.    All other systems reviewed and are negative.  Objective:     Vital Signs (Most Recent):  Temp: 98.2 °F (36.8 °C) (02/21/22 1957)  Pulse: 68 (02/21/22 2100)  Resp: 20 (02/21/22 2100)  BP: (!) 150/77 (02/21/22 2100)  SpO2: 99 % (02/21/22 2100)   Vital Signs (24h Range):  Temp:  [98.2 °F (36.8 °C)-98.8 °F (37.1 °C)] 98.2 °F (36.8 °C)  Pulse:  [68-94] 68  Resp:  [17-20] 20  SpO2:  [95 %-100 %] 99 %  BP: (150-196)/(77-92) 150/77     Weight: 102.1 kg (225 lb)  Body mass index is 31.38 kg/m².    Physical Exam  Vitals and nursing note reviewed.   Constitutional:       Appearance: He is well-developed.   HENT:      Head: Normocephalic and atraumatic.      Right Ear: External ear normal.      Left Ear: External ear normal.      Nose: Nose normal.   Eyes:      Conjunctiva/sclera: Conjunctivae normal.      Pupils: Pupils are equal, round, and reactive to light.   Cardiovascular:      Rate and Rhythm: Normal rate and regular  rhythm.      Heart sounds: Normal heart sounds.   Pulmonary:      Effort: Pulmonary effort is normal.      Breath sounds: Normal breath sounds.   Abdominal:      General: Bowel sounds are normal.      Palpations: Abdomen is soft.   Musculoskeletal:         General: Normal range of motion.      Cervical back: Normal range of motion and neck supple.   Skin:     General: Skin is warm and dry.      Capillary Refill: Capillary refill takes less than 2 seconds.   Neurological:      Mental Status: He is alert and oriented to person, place, and time.   Psychiatric:         Behavior: Behavior normal.         Thought Content: Thought content normal.         Judgment: Judgment normal.         CRANIAL NERVES     CN III, IV, VI   Pupils are equal, round, and reactive to light.     Significant Labs: All pertinent labs within the past 24 hours have been reviewed.  CBC:   Recent Labs   Lab 02/21/22  1801   WBC 10.43   HGB 13.9*   HCT 40.8        CMP:   Recent Labs   Lab 02/21/22  1801   *   K 4.1   CL 98   CO2 26      BUN 8   CREATININE 0.8   CALCIUM 9.7   PROT 7.6   ALBUMIN 4.8   BILITOT 0.7   ALKPHOS 55   AST 27   ALT 26   ANIONGAP 9   EGFRNONAA >60.0     Cardiac Markers: No results for input(s): CKMB, MYOGLOBIN, BNP, TROPISTAT in the last 48 hours.  Troponin:   Recent Labs   Lab 02/21/22  1801   TROPONINI <0.030       Significant Imaging: I have reviewed all pertinent imaging results/findings within the past 24 hours.    Assessment/Plan:     * Other chest pain  Myoview; serial biomarkers        VTE Risk Mitigation (From admission, onward)         Ordered     enoxaparin injection 40 mg  Daily         02/21/22 2107     IP VTE HIGH RISK PATIENT  Once         02/21/22 2107     Place sequential compression device  Until discontinued         02/21/22 2107                   Prashanth Moscoso MD  Department of Hospital Medicine   [facility] - Emergency Dept

## 2022-02-22 NOTE — PROGRESS NOTES
@ 12:15  PATIENT INJECTED WITH MYOVIEW IV FOR STRESS IMAGES.    LEXISCAN STRESS            RELEASE NPO STATUS FROM NUCLEAR MEDICINE.

## 2022-02-23 ENCOUNTER — PATIENT MESSAGE (OUTPATIENT)
Dept: FAMILY MEDICINE | Facility: CLINIC | Age: 62
End: 2022-02-23
Payer: OTHER GOVERNMENT

## 2022-02-24 DIAGNOSIS — M25.522 LEFT ELBOW PAIN: Primary | ICD-10-CM

## 2022-03-04 ENCOUNTER — OFFICE VISIT (OUTPATIENT)
Dept: ORTHOPEDICS | Facility: CLINIC | Age: 62
End: 2022-03-04
Payer: OTHER GOVERNMENT

## 2022-03-04 ENCOUNTER — OFFICE VISIT (OUTPATIENT)
Dept: FAMILY MEDICINE | Facility: CLINIC | Age: 62
End: 2022-03-04
Payer: OTHER GOVERNMENT

## 2022-03-04 ENCOUNTER — HOSPITAL ENCOUNTER (OUTPATIENT)
Dept: RADIOLOGY | Facility: HOSPITAL | Age: 62
Discharge: HOME | End: 2022-03-04
Attending: ORTHOPAEDIC SURGERY
Payer: OTHER GOVERNMENT

## 2022-03-04 VITALS
WEIGHT: 222 LBS | SYSTOLIC BLOOD PRESSURE: 146 MMHG | HEIGHT: 70 IN | BODY MASS INDEX: 31.78 KG/M2 | OXYGEN SATURATION: 97 % | HEART RATE: 66 BPM | DIASTOLIC BLOOD PRESSURE: 72 MMHG | TEMPERATURE: 98 F

## 2022-03-04 VITALS — BODY MASS INDEX: 32.21 KG/M2 | WEIGHT: 225 LBS | RESPIRATION RATE: 16 BRPM | HEIGHT: 70 IN

## 2022-03-04 DIAGNOSIS — Z01.818 PRE-OP TESTING: ICD-10-CM

## 2022-03-04 DIAGNOSIS — M25.522 LEFT ELBOW PAIN: ICD-10-CM

## 2022-03-04 DIAGNOSIS — K21.9 GASTROESOPHAGEAL REFLUX DISEASE, UNSPECIFIED WHETHER ESOPHAGITIS PRESENT: ICD-10-CM

## 2022-03-04 DIAGNOSIS — G47.00 INSOMNIA, UNSPECIFIED TYPE: Primary | ICD-10-CM

## 2022-03-04 DIAGNOSIS — R35.1 NOCTURIA: ICD-10-CM

## 2022-03-04 DIAGNOSIS — I10 ESSENTIAL HYPERTENSION: ICD-10-CM

## 2022-03-04 DIAGNOSIS — R53.83 FATIGUE, UNSPECIFIED TYPE: ICD-10-CM

## 2022-03-04 DIAGNOSIS — G56.22 CUBITAL TUNNEL SYNDROME ON LEFT: Primary | ICD-10-CM

## 2022-03-04 PROCEDURE — 99999 PR PBB SHADOW E&M-EST. PATIENT-LVL III: ICD-10-PCS | Mod: PBBFAC,,, | Performed by: ORTHOPAEDIC SURGERY

## 2022-03-04 PROCEDURE — 99203 OFFICE O/P NEW LOW 30 MIN: CPT | Mod: S$PBB,,, | Performed by: ORTHOPAEDIC SURGERY

## 2022-03-04 PROCEDURE — 99213 OFFICE O/P EST LOW 20 MIN: CPT | Mod: PBBFAC,PN | Performed by: ORTHOPAEDIC SURGERY

## 2022-03-04 PROCEDURE — 99214 OFFICE O/P EST MOD 30 MIN: CPT | Mod: S$GLB,,, | Performed by: FAMILY MEDICINE

## 2022-03-04 PROCEDURE — 73080 XR ELBOW COMPLETE 3 VIEW LEFT: ICD-10-PCS | Mod: 26,LT,, | Performed by: RADIOLOGY

## 2022-03-04 PROCEDURE — 99203 PR OFFICE/OUTPT VISIT, NEW, LEVL III, 30-44 MIN: ICD-10-PCS | Mod: S$PBB,,, | Performed by: ORTHOPAEDIC SURGERY

## 2022-03-04 PROCEDURE — 73080 X-RAY EXAM OF ELBOW: CPT | Mod: 26,LT,, | Performed by: RADIOLOGY

## 2022-03-04 PROCEDURE — 99999 PR PBB SHADOW E&M-EST. PATIENT-LVL III: CPT | Mod: PBBFAC,,, | Performed by: ORTHOPAEDIC SURGERY

## 2022-03-04 PROCEDURE — 99214 PR OFFICE/OUTPT VISIT, EST, LEVL IV, 30-39 MIN: ICD-10-PCS | Mod: S$GLB,,, | Performed by: FAMILY MEDICINE

## 2022-03-04 PROCEDURE — 73080 X-RAY EXAM OF ELBOW: CPT | Mod: TC,PN,LT

## 2022-03-04 RX ORDER — TRAZODONE HYDROCHLORIDE 50 MG/1
50 TABLET ORAL NIGHTLY
Qty: 30 TABLET | Refills: 2 | Status: SHIPPED | OUTPATIENT
Start: 2022-03-04 | End: 2022-05-25

## 2022-03-04 RX ORDER — PANTOPRAZOLE SODIUM 40 MG/1
40 TABLET, DELAYED RELEASE ORAL DAILY
Qty: 90 TABLET | Refills: 1 | Status: SHIPPED | OUTPATIENT
Start: 2022-03-04 | End: 2022-06-02

## 2022-03-04 RX ORDER — CEFAZOLIN SODIUM 1 G/3ML
2 INJECTION, POWDER, FOR SOLUTION INTRAMUSCULAR; INTRAVENOUS
Status: CANCELLED | OUTPATIENT
Start: 2022-03-04

## 2022-03-04 NOTE — PROGRESS NOTES
3/4/2022    Past Medical History:   Diagnosis Date    Hypertension        No past surgical history on file.    Current Outpatient Medications   Medication Sig    aspirin (ECOTRIN) 81 MG EC tablet Take 81 mg by mouth once daily.    coenzyme Q10 200 mg capsule Take 200 mg by mouth once daily.    fluticasone propionate (FLONASE) 50 mcg/actuation nasal spray 1 spray by Each Nostril route once daily.    lisinopriL (PRINIVIL,ZESTRIL) 40 MG tablet Take 1 tablet (40 mg total) by mouth once daily.    multivitamin (THERAGRAN) per tablet Take 1 tablet by mouth once daily.    omega-3 fatty acids/fish oil (FISH OIL-OMEGA-3 FATTY ACIDS) 300-1,000 mg capsule Take 1 capsule by mouth once daily.    vit A/vit C/vit E/zinc/copper (PRESERVISION AREDS ORAL) Take 1 tablet by mouth once daily.     No current facility-administered medications for this visit.       Review of patient's allergies indicates:   Allergen Reactions    Codeine     Sulfa (sulfonamide antibiotics)     Zonisamide        No family history on file.    Social History     Socioeconomic History    Marital status:    Occupational History    Occupation:    Tobacco Use    Smoking status: Current Every Day Smoker    Smokeless tobacco: Never Used   Substance and Sexual Activity    Alcohol use: Yes     Comment: SOCIAL    Drug use: Not Currently    Sexual activity: Not Currently       Chief Complaint:   Chief Complaint   Patient presents with    Left Elbow - Pain, Initial Visit, Initial Assessment     NP: Chronic Left Elbow pain +7 mo. Denies injury. [facility]. Denies pain in elbow today. C/o numbness in left hand (pinky aspect). EMG results attached.          History of present illness:    This is a 61 y.o. year old male who complains of patient has a history of chronic elbow pain x7 months no history of any injury she has been cans treated  "conservatively in the past through the [facility] also complains of numbness in his 5th finger patient comes with a nerve conduction test    Review of Systems:    Constitution: Denies chills, fever, and sweats.  HENT: Denies headaches or blurry vision.  Cardiovascular: Denies chest pain or irregular heart beat.  Respiratory: Denies cough or shortness of breath.  Gastrointestinal: Denies abdominal pain, nausea, or vomiting.  Musculoskeletal:  Denies muscle cramps.  Neurological: Denies dizziness or focal weakness.  Psychiatric/Behavioral: Normal mental status.  Hematologic/Lymphatic: Denies bleeding problem or easy bruising/bleeding.  Skin: Denies rash or suspicious lesions.    Examination:    Vital Signs:    Vitals:    03/04/22 0931   Resp: 16   Weight: 102.1 kg (225 lb)   Height: 5' 10" (1.778 m)   PainSc: 0-No pain   PainLoc: Elbow       Body mass index is 32.28 kg/m².    This a well-developed, well nourished patient in no acute distress.    Alert and oriented x 3 and cooperative to examination.       Physical Exam:  Left elbow-patient does have a mild positive Tinel sign at the elbow medially over the cubital tunnel in the hand he has positive atrophy interosseous muscle and he has decreased sensation in the 5th finger he has normal sensation in the median nerve distribution    Imaging:  X-ray of his left elbow was negative       Assessment: Cubital tunnel syndrome on left        Plan:  Discussed the compression of the nerve with the patient at the elbow would recommend decompression and transposition of the ulnar nerve at the elbow.  We will also going to get the official results from the nerve conduction test patient understands the procedure and wishes to proceed      DISCLAIMER: This note may have been dictated using voice recognition software and may contain grammatical errors.     NOTE: Consult report sent to referring provider via [employer].    Answers for HPI/ROS submitted by the patient on " 2/25/2022  unexpected weight change: No  appetite change : No  sleep disturbance: Yes  IMMUNOCOMPROMISED: No  nervous/ anxious: Yes  dysphoric mood: No  rash: No  visual disturbance: No  eye redness: No  eye pain: No  ear pain: No  tinnitus: Yes  hearing loss: Yes  sinus pressure : No  nosebleeds: No  enviro allergies: No  food allergies: No  cough: No  shortness of breath: No  sweating: No  frequency: No  difficulty urinating: No  hematuria: No  chest pain: No  palpitations: No  nausea: No  vomiting: No  diarrhea: No  blood in stool: No  constipation: No  headaches: No  dizziness: No  numbness: Yes  seizures: No  joint swelling: Yes  myalgia: No  weakness: Yes  back pain: No  Pain Chronicity: recurrent  History of trauma: No  Onset: more than 1 month ago  Frequency: 2 to 4 times per day  Progression since onset: gradually improving  Injury mechanism: falling  injury location: at home  pain- numeric: 3/10  pain location: left fingers  pain quality: tightness, numbing  Radiating Pain: Yes  If your pain is radiating, to what part of the body?: left hand  Aggravating factors: activity, cold, exercise, flexion  fever: No  inability to bear weight: No  itching: No  joint locking: Yes  limited range of motion: Yes  stiffness: Yes  tingling: Yes  Treatments tried: OTC pain meds, other  physical therapy: ineffective  Improvement on treatment: mild

## 2022-03-04 NOTE — H&P (VIEW-ONLY)
3/4/2022    Past Medical History:   Diagnosis Date    Hypertension        No past surgical history on file.    Current Outpatient Medications   Medication Sig    aspirin (ECOTRIN) 81 MG EC tablet Take 81 mg by mouth once daily.    coenzyme Q10 200 mg capsule Take 200 mg by mouth once daily.    fluticasone propionate (FLONASE) 50 mcg/actuation nasal spray 1 spray by Each Nostril route once daily.    lisinopriL (PRINIVIL,ZESTRIL) 40 MG tablet Take 1 tablet (40 mg total) by mouth once daily.    multivitamin (THERAGRAN) per tablet Take 1 tablet by mouth once daily.    omega-3 fatty acids/fish oil (FISH OIL-OMEGA-3 FATTY ACIDS) 300-1,000 mg capsule Take 1 capsule by mouth once daily.    vit A/vit C/vit E/zinc/copper (PRESERVISION AREDS ORAL) Take 1 tablet by mouth once daily.     No current facility-administered medications for this visit.       Review of patient's allergies indicates:   Allergen Reactions    Codeine     Sulfa (sulfonamide antibiotics)     Zonisamide        No family history on file.    Social History     Socioeconomic History    Marital status:    Occupational History    Occupation:    Tobacco Use    Smoking status: Current Every Day Smoker    Smokeless tobacco: Never Used   Substance and Sexual Activity    Alcohol use: Yes     Comment: SOCIAL    Drug use: Not Currently    Sexual activity: Not Currently       Chief Complaint:   Chief Complaint   Patient presents with    Left Elbow - Pain, Initial Visit, Initial Assessment     NP: Chronic Left Elbow pain +7 mo. Denies injury. [facility] symptoms. Denies pain in elbow today. C/o numbness in left hand (pinky aspect). EMG results attached.          History of present illness:    This is a 61 y.o. year old male who complains of patient has a history of chronic elbow pain x7 months no history of any injury she has been cans treated  "conservatively in the past through the [facility] also complains of numbness in his 5th finger patient comes with a nerve conduction test    Review of Systems:    Constitution: Denies chills, fever, and sweats.  HENT: Denies headaches or blurry vision.  Cardiovascular: Denies chest pain or irregular heart beat.  Respiratory: Denies cough or shortness of breath.  Gastrointestinal: Denies abdominal pain, nausea, or vomiting.  Musculoskeletal:  Denies muscle cramps.  Neurological: Denies dizziness or focal weakness.  Psychiatric/Behavioral: Normal mental status.  Hematologic/Lymphatic: Denies bleeding problem or easy bruising/bleeding.  Skin: Denies rash or suspicious lesions.    Examination:    Vital Signs:    Vitals:    03/04/22 0931   Resp: 16   Weight: 102.1 kg (225 lb)   Height: 5' 10" (1.778 m)   PainSc: 0-No pain   PainLoc: Elbow       Body mass index is 32.28 kg/m².    This a well-developed, well nourished patient in no acute distress.    Alert and oriented x 3 and cooperative to examination.       Physical Exam:  Left elbow-patient does have a mild positive Tinel sign at the elbow medially over the cubital tunnel in the hand he has positive atrophy interosseous muscle and he has decreased sensation in the 5th finger he has normal sensation in the median nerve distribution    Imaging:  X-ray of his left elbow was negative       Assessment: Cubital tunnel syndrome on left        Plan:  Discussed the compression of the nerve with the patient at the elbow would recommend decompression and transposition of the ulnar nerve at the elbow.  We will also going to get the official results from the nerve conduction test patient understands the procedure and wishes to proceed      DISCLAIMER: This note may have been dictated using voice recognition software and may contain grammatical errors.     NOTE: Consult report sent to referring provider via [employer].    Answers for HPI/ROS submitted by the patient on " 2/25/2022  unexpected weight change: No  appetite change : No  sleep disturbance: Yes  IMMUNOCOMPROMISED: No  nervous/ anxious: Yes  dysphoric mood: No  rash: No  visual disturbance: No  eye redness: No  eye pain: No  ear pain: No  tinnitus: Yes  hearing loss: Yes  sinus pressure : No  nosebleeds: No  enviro allergies: No  food allergies: No  cough: No  shortness of breath: No  sweating: No  frequency: No  difficulty urinating: No  hematuria: No  chest pain: No  palpitations: No  nausea: No  vomiting: No  diarrhea: No  blood in stool: No  constipation: No  headaches: No  dizziness: No  numbness: Yes  seizures: No  joint swelling: Yes  myalgia: No  weakness: Yes  back pain: No  Pain Chronicity: recurrent  History of trauma: No  Onset: more than 1 month ago  Frequency: 2 to 4 times per day  Progression since onset: gradually improving  Injury mechanism: falling  injury location: at home  pain- numeric: 3/10  pain location: left fingers  pain quality: tightness, numbing  Radiating Pain: Yes  If your pain is radiating, to what part of the body?: left hand  Aggravating factors: activity, cold, exercise, flexion  fever: No  inability to bear weight: No  itching: No  joint locking: Yes  limited range of motion: Yes  stiffness: Yes  tingling: Yes  Treatments tried: OTC pain meds, other  physical therapy: ineffective  Improvement on treatment: mild

## 2022-03-06 NOTE — PROGRESS NOTES
Walked in the heat and heart rate increased some.  Chest got tight.  Went to the emergency room.  He ended up being admitted stress test was negative.  Ejection fraction good there 74%.  No medication changes.  Sleep is poor wakes up couple of times a night insomnia issues.  Needs check labs.  Weight is down slightly.  Also some fatigue.  He is not anxious or nervous.  Dr. Mejia doing surgery March 21st.  Hypertension is controlled today.  Flushing some at night.  Also having some acid reflux.  Some urinary frequency.  Urinalysis is normal.  Other than some ketones.    Physical examination vital signs noted.  Neck without bruit.  No adenopathy.  Chest clear.  Heart regular rate rhythm.  Abdomen bowel sounds are positive soft and nontender.  Extremities without edema positive pedal pulses.    Impression.  Insomnia.  Flushing.  Hypertension controlled.  Fatigue.  Gastroesophageal reflux disease.    Plan sleep study.  Protonix 40 mg daily 90 pills.  Follow-up 1 month.  Get 24 hour urine for 5HIAA.  TSH.  Trazodone 50 mg HS p.r.n. for insomnia.  Thirty pills.

## 2022-03-07 ENCOUNTER — PATIENT MESSAGE (OUTPATIENT)
Dept: FAMILY MEDICINE | Facility: CLINIC | Age: 62
End: 2022-03-07
Payer: OTHER GOVERNMENT

## 2022-03-11 ENCOUNTER — HOSPITAL ENCOUNTER (OUTPATIENT)
Dept: PREADMISSION TESTING | Facility: HOSPITAL | Age: 62
Discharge: HOME | End: 2022-03-11
Attending: ORTHOPAEDIC SURGERY
Payer: OTHER GOVERNMENT

## 2022-03-11 ENCOUNTER — PATIENT MESSAGE (OUTPATIENT)
Dept: FAMILY MEDICINE | Facility: CLINIC | Age: 62
End: 2022-03-11
Payer: OTHER GOVERNMENT

## 2022-03-12 LAB — TSH SERPL-ACNC: 0.88 MIU/L (ref 0.4–4.5)

## 2022-03-13 ENCOUNTER — PATIENT MESSAGE (OUTPATIENT)
Dept: ADMINISTRATIVE | Facility: OTHER | Age: 62
End: 2022-03-13
Payer: OTHER GOVERNMENT

## 2022-03-13 PROBLEM — R07.89 OTHER CHEST PAIN: Status: RESOLVED | Noted: 2022-02-21 | Resolved: 2022-03-13

## 2022-03-13 PROBLEM — L57.0 ACTINIC KERATOSIS: Status: RESOLVED | Noted: 2021-11-01 | Resolved: 2022-03-13

## 2022-03-13 PROBLEM — G47.00 INSOMNIA: Status: ACTIVE | Noted: 2022-03-13

## 2022-03-19 ENCOUNTER — ANESTHESIA EVENT (OUTPATIENT)
Dept: SURGERY | Facility: HOSPITAL | Age: 62
End: 2022-03-19
Payer: OTHER GOVERNMENT

## 2022-03-21 ENCOUNTER — HOSPITAL ENCOUNTER (OUTPATIENT)
Facility: HOSPITAL | Age: 62
Discharge: HOME | End: 2022-03-21
Attending: ORTHOPAEDIC SURGERY | Admitting: ORTHOPAEDIC SURGERY
Payer: OTHER GOVERNMENT

## 2022-03-21 ENCOUNTER — PATIENT MESSAGE (OUTPATIENT)
Dept: SURGERY | Facility: HOSPITAL | Age: 62
End: 2022-03-21
Payer: OTHER GOVERNMENT

## 2022-03-21 ENCOUNTER — ANESTHESIA (OUTPATIENT)
Dept: SURGERY | Facility: HOSPITAL | Age: 62
End: 2022-03-21
Payer: OTHER GOVERNMENT

## 2022-03-21 DIAGNOSIS — G56.22 CUBITAL TUNNEL SYNDROME ON LEFT: ICD-10-CM

## 2022-03-21 DIAGNOSIS — Z01.818 PRE-OP TESTING: ICD-10-CM

## 2022-03-21 PROCEDURE — 71000015 HC POSTOP RECOV 1ST HR: Performed by: ORTHOPAEDIC SURGERY

## 2022-03-21 PROCEDURE — 64718 REVISE ULNAR NERVE AT ELBOW: CPT | Mod: LT,,, | Performed by: ORTHOPAEDIC SURGERY

## 2022-03-21 PROCEDURE — 37000008 HC ANESTHESIA 1ST 15 MINUTES: Performed by: ORTHOPAEDIC SURGERY

## 2022-03-21 PROCEDURE — D9220A PRA ANESTHESIA: ICD-10-PCS | Mod: ANES,,, | Performed by: ANESTHESIOLOGY

## 2022-03-21 PROCEDURE — 01710 ANES PX NRV MUSC UPR A&E NOS: CPT | Performed by: ORTHOPAEDIC SURGERY

## 2022-03-21 PROCEDURE — 25000003 PHARM REV CODE 250: Performed by: ANESTHESIOLOGY

## 2022-03-21 PROCEDURE — 99900104 DSU ONLY-NO CHARGE-EA ADD'L HR (STAT): Performed by: ORTHOPAEDIC SURGERY

## 2022-03-21 PROCEDURE — D9220A PRA ANESTHESIA: Mod: ANES,,, | Performed by: ANESTHESIOLOGY

## 2022-03-21 PROCEDURE — 36000706: Performed by: ORTHOPAEDIC SURGERY

## 2022-03-21 PROCEDURE — 63600175 PHARM REV CODE 636 W HCPCS: Performed by: ORTHOPAEDIC SURGERY

## 2022-03-21 PROCEDURE — 37000009 HC ANESTHESIA EA ADD 15 MINS: Performed by: ORTHOPAEDIC SURGERY

## 2022-03-21 PROCEDURE — 27200651 HC AIRWAY, LMA: Performed by: ANESTHESIOLOGY

## 2022-03-21 PROCEDURE — 36000707: Performed by: ORTHOPAEDIC SURGERY

## 2022-03-21 PROCEDURE — 25000003 PHARM REV CODE 250: Performed by: NURSE ANESTHETIST, CERTIFIED REGISTERED

## 2022-03-21 PROCEDURE — 71000039 HC RECOVERY, EACH ADD'L HOUR: Performed by: ORTHOPAEDIC SURGERY

## 2022-03-21 PROCEDURE — D9220A PRA ANESTHESIA: Mod: CRNA,,, | Performed by: NURSE ANESTHETIST, CERTIFIED REGISTERED

## 2022-03-21 PROCEDURE — D9220A PRA ANESTHESIA: ICD-10-PCS | Mod: CRNA,,, | Performed by: NURSE ANESTHETIST, CERTIFIED REGISTERED

## 2022-03-21 PROCEDURE — 71000033 HC RECOVERY, INTIAL HOUR: Performed by: ORTHOPAEDIC SURGERY

## 2022-03-21 PROCEDURE — 64718 PR REVISE ULNAR NERVE AT ELBOW: ICD-10-PCS | Mod: LT,,, | Performed by: ORTHOPAEDIC SURGERY

## 2022-03-21 PROCEDURE — 99900103 DSU ONLY-NO CHARGE-INITIAL HR (STAT)

## 2022-03-21 PROCEDURE — 63600175 PHARM REV CODE 636 W HCPCS: Performed by: NURSE ANESTHETIST, CERTIFIED REGISTERED

## 2022-03-21 PROCEDURE — 25000003 PHARM REV CODE 250: Performed by: ORTHOPAEDIC SURGERY

## 2022-03-21 PROCEDURE — 99900103 DSU ONLY-NO CHARGE-INITIAL HR (STAT): Performed by: ORTHOPAEDIC SURGERY

## 2022-03-21 RX ORDER — KETAMINE HYDROCHLORIDE 10 MG/ML
INJECTION, SOLUTION INTRAMUSCULAR; INTRAVENOUS
Status: DISCONTINUED | OUTPATIENT
Start: 2022-03-21 | End: 2022-03-21

## 2022-03-21 RX ORDER — ONDANSETRON 2 MG/ML
4 INJECTION INTRAMUSCULAR; INTRAVENOUS EVERY 12 HOURS PRN
Status: DISCONTINUED | OUTPATIENT
Start: 2022-03-21 | End: 2022-03-21 | Stop reason: HOSPADM

## 2022-03-21 RX ORDER — OXYCODONE HYDROCHLORIDE 10 MG/1
10 TABLET ORAL EVERY 4 HOURS PRN
Status: DISCONTINUED | OUTPATIENT
Start: 2022-03-21 | End: 2022-03-21 | Stop reason: HOSPADM

## 2022-03-21 RX ORDER — BUPIVACAINE HYDROCHLORIDE 2.5 MG/ML
INJECTION, SOLUTION EPIDURAL; INFILTRATION; INTRACAUDAL
Status: DISCONTINUED | OUTPATIENT
Start: 2022-03-21 | End: 2022-03-21 | Stop reason: HOSPADM

## 2022-03-21 RX ORDER — OXYCODONE HYDROCHLORIDE 5 MG/1
5 TABLET ORAL
Status: DISCONTINUED | OUTPATIENT
Start: 2022-03-21 | End: 2022-03-21 | Stop reason: HOSPADM

## 2022-03-21 RX ORDER — FENTANYL CITRATE 50 UG/ML
INJECTION, SOLUTION INTRAMUSCULAR; INTRAVENOUS
Status: DISCONTINUED | OUTPATIENT
Start: 2022-03-21 | End: 2022-03-21

## 2022-03-21 RX ORDER — HYDROMORPHONE HYDROCHLORIDE 2 MG/ML
0.2 INJECTION, SOLUTION INTRAMUSCULAR; INTRAVENOUS; SUBCUTANEOUS EVERY 5 MIN PRN
Status: DISCONTINUED | OUTPATIENT
Start: 2022-03-21 | End: 2022-03-21 | Stop reason: HOSPADM

## 2022-03-21 RX ORDER — LIDOCAINE HYDROCHLORIDE 10 MG/ML
1 INJECTION, SOLUTION EPIDURAL; INFILTRATION; INTRACAUDAL; PERINEURAL ONCE
Status: DISCONTINUED | OUTPATIENT
Start: 2022-03-21 | End: 2022-03-21 | Stop reason: HOSPADM

## 2022-03-21 RX ORDER — KETOROLAC TROMETHAMINE 30 MG/ML
INJECTION, SOLUTION INTRAMUSCULAR; INTRAVENOUS
Status: DISCONTINUED | OUTPATIENT
Start: 2022-03-21 | End: 2022-03-21

## 2022-03-21 RX ORDER — SODIUM CHLORIDE 0.9 % (FLUSH) 0.9 %
10 SYRINGE (ML) INJECTION
Status: DISCONTINUED | OUTPATIENT
Start: 2022-03-21 | End: 2022-03-21 | Stop reason: HOSPADM

## 2022-03-21 RX ORDER — ONDANSETRON HYDROCHLORIDE 2 MG/ML
INJECTION, SOLUTION INTRAMUSCULAR; INTRAVENOUS
Status: DISCONTINUED | OUTPATIENT
Start: 2022-03-21 | End: 2022-03-21

## 2022-03-21 RX ORDER — OXYCODONE AND ACETAMINOPHEN 10; 325 MG/1; MG/1
1 TABLET ORAL EVERY 4 HOURS PRN
Qty: 30 TABLET | Refills: 0 | Status: SHIPPED | OUTPATIENT
Start: 2022-03-21 | End: 2023-05-16

## 2022-03-21 RX ORDER — PROPOFOL 10 MG/ML
VIAL (ML) INTRAVENOUS
Status: DISCONTINUED | OUTPATIENT
Start: 2022-03-21 | End: 2022-03-21

## 2022-03-21 RX ORDER — ONDANSETRON 4 MG/1
4 TABLET, ORALLY DISINTEGRATING ORAL EVERY 6 HOURS PRN
Qty: 20 TABLET | Refills: 1 | Status: SHIPPED | OUTPATIENT
Start: 2022-03-21 | End: 2023-05-16

## 2022-03-21 RX ORDER — CEFAZOLIN SODIUM 2 G/50ML
2 SOLUTION INTRAVENOUS
Status: COMPLETED | OUTPATIENT
Start: 2022-03-21 | End: 2022-03-21

## 2022-03-21 RX ORDER — MIDAZOLAM HYDROCHLORIDE 1 MG/ML
INJECTION INTRAMUSCULAR; INTRAVENOUS
Status: DISCONTINUED | OUTPATIENT
Start: 2022-03-21 | End: 2022-03-21

## 2022-03-21 RX ORDER — DEXAMETHASONE SODIUM PHOSPHATE 4 MG/ML
INJECTION, SOLUTION INTRA-ARTICULAR; INTRALESIONAL; INTRAMUSCULAR; INTRAVENOUS; SOFT TISSUE
Status: DISCONTINUED | OUTPATIENT
Start: 2022-03-21 | End: 2022-03-21

## 2022-03-21 RX ORDER — FENTANYL CITRATE 50 UG/ML
25 INJECTION, SOLUTION INTRAMUSCULAR; INTRAVENOUS EVERY 5 MIN PRN
Status: DISCONTINUED | OUTPATIENT
Start: 2022-03-21 | End: 2022-03-21 | Stop reason: HOSPADM

## 2022-03-21 RX ORDER — LIDOCAINE HCL/PF 100 MG/5ML
SYRINGE (ML) INTRAVENOUS
Status: DISCONTINUED | OUTPATIENT
Start: 2022-03-21 | End: 2022-03-21

## 2022-03-21 RX ORDER — LIDOCAINE HYDROCHLORIDE 20 MG/ML
JELLY TOPICAL
Status: DISCONTINUED | OUTPATIENT
Start: 2022-03-21 | End: 2022-03-21

## 2022-03-21 RX ADMIN — PROPOFOL 150 MG: 10 INJECTION, EMULSION INTRAVENOUS at 08:03

## 2022-03-21 RX ADMIN — KETAMINE HYDROCHLORIDE 25 MG: 10 INJECTION, SOLUTION INTRAMUSCULAR; INTRAVENOUS at 08:03

## 2022-03-21 RX ADMIN — FENTANYL CITRATE 50 MCG: 50 INJECTION, SOLUTION INTRAMUSCULAR; INTRAVENOUS at 08:03

## 2022-03-21 RX ADMIN — DEXAMETHASONE SODIUM PHOSPHATE 4 MG: 4 INJECTION, SOLUTION INTRA-ARTICULAR; INTRALESIONAL; INTRAMUSCULAR; INTRAVENOUS; SOFT TISSUE at 08:03

## 2022-03-21 RX ADMIN — KETOROLAC TROMETHAMINE 30 MG: 30 INJECTION, SOLUTION INTRAMUSCULAR; INTRAVENOUS at 09:03

## 2022-03-21 RX ADMIN — SODIUM CHLORIDE, SODIUM GLUCONATE, SODIUM ACETATE, POTASSIUM CHLORIDE, MAGNESIUM CHLORIDE, SODIUM PHOSPHATE, DIBASIC, AND POTASSIUM PHOSPHATE: .53; .5; .37; .037; .03; .012; .00082 INJECTION, SOLUTION INTRAVENOUS at 08:03

## 2022-03-21 RX ADMIN — SODIUM CHLORIDE, SODIUM GLUCONATE, SODIUM ACETATE, POTASSIUM CHLORIDE, MAGNESIUM CHLORIDE, SODIUM PHOSPHATE, DIBASIC, AND POTASSIUM PHOSPHATE: .53; .5; .37; .037; .03; .012; .00082 INJECTION, SOLUTION INTRAVENOUS at 06:03

## 2022-03-21 RX ADMIN — MIDAZOLAM HYDROCHLORIDE 2 MG: 1 INJECTION, SOLUTION INTRAMUSCULAR; INTRAVENOUS at 07:03

## 2022-03-21 RX ADMIN — ONDANSETRON 4 MG: 2 INJECTION, SOLUTION INTRAMUSCULAR; INTRAVENOUS at 07:03

## 2022-03-21 RX ADMIN — LIDOCAINE HYDROCHLORIDE 2 ML: 20 JELLY TOPICAL at 08:03

## 2022-03-21 RX ADMIN — CEFAZOLIN SODIUM 2 G: 2 SOLUTION INTRAVENOUS at 08:03

## 2022-03-21 RX ADMIN — LIDOCAINE HYDROCHLORIDE 100 MG: 20 INJECTION INTRAVENOUS at 08:03

## 2022-03-21 NOTE — ANESTHESIA PROCEDURE NOTES
Intubation    Date/Time: 3/21/2022 8:08 AM  Performed by: Dell White Jr. CRNA  Authorized by: Primitivo Meza MD     Intubation:     Induction:  Intravenous    Intubated:  Postinduction    Mask Ventilation:  Easy mask    Attempts:  1    Attempted By:  CRNA    Difficult Airway Encountered?: No      Complications:  None    Airway Device:  Supraglottic airway/LMA    Airway Device Size:  4.0    Style/Cuff Inflation:  Cuffed (inflated to minimal occlusive pressure)    Placement Verified By:  Capnometry    Complicating Factors:  None    Findings Post-Intubation:  BS equal bilateral and atraumatic/condition of teeth unchanged

## 2022-03-21 NOTE — OP NOTE
[facility]  Orthopedic Surgery   Operative Note    SUMMARY     Date of Procedure: 3/21/2022     Procedure: Procedure(s) (LRB):  TRANSPOSITION, NERVE, ULNAR (Left)       Surgeon(s) and Role:     * Adama Mejia MD - Primary    Assistant:  Palmira Kidd    Pre-Operative Diagnosis: Cubital tunnel syndrome on left [G56.22]    Post-Operative Diagnosis: Post-Op Diagnosis Codes:     * Cubital tunnel syndrome on left [G56.22]    Anesthesia: General      Estimated Blood Loss (EBL): 5 mL           Implants: * No implants in log *    Specimens:   Specimen (24h ago, onward)            None           Complications:  None           Description of the Procedure:  The patient was given a general anesthetic and placed in a supine position a tourniquet was placed on the left upper arm the left arm was then prepped Hibiclens and ChloraPrep and draped in a sterile manner.  A tourniquet was placed to 250 mmHg the arm was exsanguinated prior to applying the tourniquet.  A longitudinal incision was made over the cubital tunnel area dissection was then made bluntly down to the nerve the nerve was identified was freed up proximally and distally it was freed up about 6 cm proximal to the medial epicondyle and then was Rich traced distally down to the aponeurosis of the flexor carpi ulnaris muscle the muscle the aponeurosis was released the nerve was released and then the nerve was brought up anteriorly a light both fascial plane dissection was made and then the nerve was placed on the anterior surface of the muscle belly the light both fascial sling was then sutured down with the nerve pushed laterally and then was tacked down with the fascial like a fascial sling the nerve stayed in good position anterior on the flexor belly the cubital tunnel itself was then closed with a continuous 3-0 Vicryl stitch hemostasis was secured with the Bovie the subcu was closed with interrupted 3-0 Vicryl stitch and a 3-0 Monocryl  subcuticular stitch placed in the skin Dermabond applied to the wound soft dressing was applied the arm was placed in a posterior splint with 90° of flexion at the elbow.

## 2022-03-21 NOTE — DISCHARGE SUMMARY
[facility]  Discharge Note  Short Stay    Procedure(s) (LRB):  TRANSPOSITION, NERVE, ULNAR (Left)    OUTCOME: Patient tolerated treatment/procedure well without complication and is now ready for discharge.    DISPOSITION: Home or Self Care    FINAL DIAGNOSIS:  <principal problem not specified>    FOLLOWUP: In clinic the patient underwent a trans anterior transposition of the left ulnar nerve he was placed in a posterior splint.  He was given Percocet 10 mg q.4 hours as needed for pain and Norco 4 mg q.6 hours as needed for nausea.  He should keep the dressing on at all times and follow up in our clinic in 8 days.    DISCHARGE INSTRUCTIONS:    Discharge Procedure Orders   [facility] [employer] L[facility] FOR HOME USE     Diet general     Call MD for:  temperature >100.4     Call MD for:  persistent nausea and vomiting     Call MD for:  severe uncontrolled pain     Call MD for:  difficulty breathing, headache or visual disturbances     Call MD for:  redness, tenderness, or signs of infection (pain, swelling, redness, odor or green/yellow discharge around incision site)     Call MD for:  hives     Call MD for:  persistent dizziness or light-headedness     Call MD for:  extreme fatigue     Leave dressing on - Keep it clean, dry, and intact until clinic visit        TIME SPENT ON DISCHARGE:

## 2022-03-21 NOTE — PLAN OF CARE
Patient tolerated procedure and anesthesia well.  No complaints.  No apparent distress noted or reported. Vitals stable.  Denies pain denies nausea.  Tolerates po.  Discharge instructions reviewed with patient and family members.  Verbalized understanding.  Consents with chart.

## 2022-03-21 NOTE — ANESTHESIA PREPROCEDURE EVALUATION
03/21/2022  Isai Mello is a 61 y.o., male.      Pre-op Assessment    I have reviewed the Patient Summary Reports.     I have reviewed the Nursing Notes. I have reviewed the NPO Status.   I have reviewed the Medications.     Review of Systems  Anesthesia Hx:  No problems with previous Anesthesia    Social:  Smoker    Hematology/Oncology:  Hematology Normal   Oncology Normal     EENT/Dental:EENT/Dental Normal   Cardiovascular:   Hypertension    Hepatic/GI:   GERD    Musculoskeletal:   Arthritis     Neurological:  Neurology Normal    Endocrine:  Obesity / BMI > 30  Dermatological:  Skin Normal    Psych:  Psychiatric Normal           Physical Exam  General: Well nourished, Cooperative, Alert and Oriented    Airway:  Mallampati: II   Mouth Opening: Normal  TM Distance: Normal  Tongue: Normal    Dental:  Intact    Chest/Lungs:  Clear to auscultation, Normal Respiratory Rate    Heart:  Rate: Normal  Rhythm: Regular Rhythm        Anesthesia Plan  Type of Anesthesia, risks & benefits discussed:    Anesthesia Type: Gen Supraglottic Airway  Intra-op Monitoring Plan: Standard ASA Monitors  Post Op Pain Control Plan: multimodal analgesia and IV/PO Opioids PRN  Induction:  IV  Informed Consent: Informed consent signed with the Patient and all parties understand the risks and agree with anesthesia plan.  All questions answered.   ASA Score: 2  Day of Surgery Review of History & Physical: H&P Update referred to the surgeon/provider.    Ready For Surgery From Anesthesia Perspective.     .

## 2022-03-21 NOTE — ANESTHESIA POSTPROCEDURE EVALUATION
Anesthesia Post Evaluation    Patient: Isai Mello    Procedure(s) Performed: Procedure(s) (LRB):  TRANSPOSITION, NERVE, ULNAR (Left)    Final Anesthesia Type: general      Patient location during evaluation: PACU  Patient participation: Yes- Able to Participate  Level of consciousness: awake and alert  Post-procedure vital signs: reviewed and stable  Pain management: adequate  Airway patency: patent    PONV status at discharge: No PONV  Anesthetic complications: no      Cardiovascular status: hemodynamically stable  Respiratory status: unassisted and room air  Hydration status: euvolemic  Follow-up not needed.          Vitals Value Taken Time   /63 03/21/22 1045   Temp 36.8 °C (98.2 °F) 03/21/22 1015   Pulse 69 03/21/22 1045   Resp 18 03/21/22 1045   SpO2 96 % 03/21/22 1045         Event Time   Out of Recovery 10:27:00         Pain/Andrea Score: Andrea Score: 10 (3/21/2022 10:25 AM)  Modified Andrea Score: 20 (3/21/2022 10:27 AM)

## 2022-03-21 NOTE — DISCHARGE INSTRUCTIONS
We hope your stay was comfortable as you heal now, mend and rest.    For we have enjoyed taking care of you by giving your our best.    And as you get better, by regaining your health and strength;   We count it as a privilege to have served you and hope your time at [facility] was well spent.      Thank  You!!!         Post op instructions for prevention of DVT  What is deep vein thrombosis?  Deep vein thrombosis (DVT) is the medical term for blood clots in the deep veins of the leg.  These blood clots can be dangerous.  A DVT can block a blood vessel and keep blood from getting where it needs to go.  Another problem is that the clot can travel to other parts of the body such as the lungs.  A clot that travels to the lungs is called a pulmonary embolus (PE) and can cause serious problems with breathing which can lead to death.  Am I at risk for DVT/PE?  If you are not very active, you are at risk of DVT.  Anyone confined to bed, sitting for long periods of time, recovering from surgery, etc. increases the risk of DVT.  Other risk factors are cancer diagnosis, certain medications, estrogen replacement in any form,older age, obesity, pregnancy, smoking, history of clotting disorders, and dehydration.  How will I know if I have a DVT?  Swelling in the lower leg  Pain  Warmth, redness, hardness or bulging of the vein  If you have any of these symptoms, call your doctors office right away.  Some people will not have any symptoms until the clot moves to the lungs.  What are the symptoms of a PE?  Panting, shortness of breath, or trouble breathing  Sharp, knife-like chest pain when you breathe  Coughing or coughing up blood  Rapid heartbeat  If you have any of these symptoms or get worse quickly, call 911 for emergency treatment.  How can I prevent a DVT?  Avoid long periods of inactivity and dont cross your legs--get up and walk around every hour or so.  Stay active--walking after surgery is highly encouraged.  This means  "you should get out of the house and walk in the neighborhood.  Going up and down stairs will not impair healing (unless advised against such activity by your doctor).    Drink plenty of noncaffeinated, nonalcoholic fluids each day to prevent dehydration.  Wear special support stockings, if they have been advised by your doctor.  If you travel, stop at least once an hour and walk around.  Avoid smoking (assistance with stopping is available through your healthcare provider)  Always notify your doctor if you are not able to follow the post operative instructions that are given to you at the time of discharge.  It may be necessary to prescribe one of the medications available to prevent DVT.         Discharge Instructions: After Your Surgery/Procedure  Youve just had surgery. During surgery you were given medicine called anesthesia to keep you relaxed and free of pain. After surgery you may have some pain or nausea. This is common. Here are some tips for feeling better and getting well after surgery.     Stay on schedule with your medication.   Going home  Your doctor or nurse will show you how to take care of yourself when you go home. He or she will also answer your questions. Have an adult family member or friend drive you home.      For your safety we recommend these precaution for the first 24 hours after your procedure:  Do not drive or use heavy equipment.  Do not make important decisions or sign legal papers.  Do not drink alcohol.  Have someone stay with you, if needed. He or she can watch for problems and help keep you safe.  Your concentration, balance, coordination, and judgement may be impaired for many hours after anesthesia.  Use caution when ambulating or standing up.     You may feel weak and "washed out" after anesthesia and surgery.      Subtle residual effects of general anesthesia or sedation with regional / local anesthesia can last more than 24 hours.  Rest for the remainder of the day or longer " if your Doctor/Surgeon has advised you to do so.  Although you may feel normal within the first 24 hours, your reflexes and mental ability may be impaired without you realizing it.  You may feel dizzy, lightheaded or sleepy for 24 hours or longer.      Be sure to go to all follow-up visits with your doctor. And rest after your surgery for as long as your doctor tells you to.  Coping with pain  If you have pain after surgery, pain medicine will help you feel better. Take it as told, before pain becomes severe. Also, ask your doctor or pharmacist about other ways to control pain. This might be with heat, ice, or relaxation. And follow any other instructions your surgeon or nurse gives you.  Tips for taking pain medicine  To get the best relief possible, remember these points:  Pain medicines can upset your stomach. Taking them with a little food may help.  Most pain relievers taken by mouth need at least 20 to 30 minutes to start to work.  Taking medicine on a schedule can help you remember to take it. Try to time your medicine so that you can take it before starting an activity. This might be before you get dressed, go for a walk, or sit down for dinner.  Constipation is a common side effect of pain medicines. Call your doctor before taking any medicines such as laxatives or stool softeners to help ease constipation. Also ask if you should skip any foods. Drinking lots of fluids and eating foods such as fruits and vegetables that are high in fiber can also help. Remember, do not take laxatives unless your surgeon has prescribed them.  Drinking alcohol and taking pain medicine can cause dizziness and slow your breathing. It can even be deadly. Do not drink alcohol while taking pain medicine.  Pain medicine can make you react more slowly to things. Do not drive or run machinery while taking pain medicine.  Your health care provider may tell you to take acetaminophen to help ease your pain. Ask him or her how much you  are supposed to take each day. Acetaminophen or other pain relievers may interact with your prescription medicines or other over-the-counter (OTC) drugs. Some prescription medicines have acetaminophen and other ingredients. Using both prescription and OTC acetaminophen for pain can cause you to overdose. Read the labels on your OTC medicines with care. This will help you to clearly know the list of ingredients, how much to take, and any warnings. It may also help you not take too much acetaminophen. If you have questions or do not understand the information, ask your pharmacist or health care provider to explain it to you before you take the OTC medicine.  Managing nausea  Some people have an upset stomach after surgery. This is often because of anesthesia, pain, or pain medicine, or the stress of surgery. These tips will help you handle nausea and eat healthy foods as you get better. If you were on a special food plan before surgery, ask your doctor if you should follow it while you get better. These tips may help:  Do not push yourself to eat. Your body will tell you when to eat and how much.  Start off with clear liquids and soup. They are easier to digest.  Next try semi-solid foods, such as mashed potatoes, applesauce, and gelatin, as you feel ready.  Slowly move to solid foods. Dont eat fatty, rich, or spicy foods at first.  Do not force yourself to have 3 large meals a day. Instead eat smaller amounts more often.  Take pain medicines with a small amount of solid food, such as crackers or toast, to avoid nausea.     Call your surgeon if  You still have pain an hour after taking medicine. The medicine may not be strong enough.  You feel too sleepy, dizzy, or groggy. The medicine may be too strong.  You have side effects like nausea, vomiting, or skin changes, such as rash, itching, or hives.       If you have obstructive sleep apnea  You were given anesthesia medicine during surgery to keep you comfortable  and free of pain. After surgery, you may have more apnea spells because of this medicine and other medicines you were given. The spells may last longer than usual.   At home:  Keep using the continuous positive airway pressure (CPAP) device when you sleep. Unless your health care provider tells you not to, use it when you sleep, day or night. CPAP is a common device used to treat obstructive sleep apnea.  Talk with your provider before taking any pain medicine, muscle relaxants, or sedatives. Your provider will tell you about the possible dangers of taking these medicines.  © 7583-2648 [employer]. [address] [place] [address], [place] [address]. All rights reserved. This information is not intended as a substitute for professional medical care. Always follow your healthcare professional's instructions.

## 2022-03-21 NOTE — PLAN OF CARE
Pt transferred to phase II. VSS, denies pain, splint/dressing to L arm cdi, L arm sling on, tolerated clear liquids without N/V. Report given to JOHN Briseno.

## 2022-03-21 NOTE — PLAN OF CARE
Patient prepared for procedure.  No questions at this time.  Family at bedside.  Belongings placed in preop cabinet.

## 2022-03-21 NOTE — TRANSFER OF CARE
"Anesthesia Transfer of Care Note    Patient: Isai Mello    Procedure(s) Performed: Procedure(s) (LRB):  TRANSPOSITION, NERVE, ULNAR (Left)    Patient location: PACU    Anesthesia Type: general    Transport from OR: Transported from OR on 2-3 L/min O2 by NC with adequate spontaneous ventilation    Post pain: adequate analgesia    Post assessment: no apparent anesthetic complications and tolerated procedure well    Post vital signs: stable    Level of consciousness: awake, alert and oriented    Nausea/Vomiting: no nausea/vomiting    Complications: none    Transfer of care protocol was followed      Last vitals:   Visit Vitals  /65 (BP Location: Left arm, Patient Position: Lying)   Pulse (!) 54   Temp 36.9 °C (98.4 °F) (Skin)   Resp 20   Ht 5' 10" (1.778 m)   Wt 98.9 kg (218 lb)   SpO2 (!) 94%   BMI 31.28 kg/m²     "

## 2022-03-22 VITALS
TEMPERATURE: 98 F | HEIGHT: 70 IN | WEIGHT: 218 LBS | DIASTOLIC BLOOD PRESSURE: 63 MMHG | SYSTOLIC BLOOD PRESSURE: 147 MMHG | BODY MASS INDEX: 31.21 KG/M2 | HEART RATE: 69 BPM | OXYGEN SATURATION: 96 % | RESPIRATION RATE: 18 BRPM

## 2022-03-23 ENCOUNTER — TELEPHONE (OUTPATIENT)
Dept: ORTHOPEDICS | Facility: CLINIC | Age: 62
End: 2022-03-23
Payer: OTHER GOVERNMENT

## 2022-03-23 ENCOUNTER — OFFICE VISIT (OUTPATIENT)
Dept: ORTHOPEDICS | Facility: CLINIC | Age: 62
End: 2022-03-23
Payer: OTHER GOVERNMENT

## 2022-03-23 VITALS — HEIGHT: 70 IN | BODY MASS INDEX: 31.22 KG/M2 | WEIGHT: 218.06 LBS

## 2022-03-23 DIAGNOSIS — G56.22 CUBITAL TUNNEL SYNDROME ON LEFT: Primary | ICD-10-CM

## 2022-03-23 PROCEDURE — 99999 PR PBB SHADOW E&M-EST. PATIENT-LVL III: CPT | Mod: PBBFAC,,, | Performed by: ORTHOPAEDIC SURGERY

## 2022-03-23 PROCEDURE — 99024 POSTOP FOLLOW-UP VISIT: CPT | Mod: ,,, | Performed by: ORTHOPAEDIC SURGERY

## 2022-03-23 PROCEDURE — 99999 PR PBB SHADOW E&M-EST. PATIENT-LVL III: ICD-10-PCS | Mod: PBBFAC,,, | Performed by: ORTHOPAEDIC SURGERY

## 2022-03-23 PROCEDURE — 99213 OFFICE O/P EST LOW 20 MIN: CPT | Mod: PBBFAC,PN | Performed by: ORTHOPAEDIC SURGERY

## 2022-03-23 PROCEDURE — 99024 PR POST-OP FOLLOW-UP VISIT: ICD-10-PCS | Mod: ,,, | Performed by: ORTHOPAEDIC SURGERY

## 2022-03-23 NOTE — PROGRESS NOTES
3/23/2022    Past Medical History:   Diagnosis Date    Hypertension        Past Surgical History:   Procedure Laterality Date    KNEE SURGERY      ULNAR NERVE TRANSPOSITION Left 3/21/2022    Procedure: TRANSPOSITION, NERVE, ULNAR;  Surgeon: Adama Mejia MD;  Location: [facility];  Service: Orthopedics;  Laterality: Left;       Current Outpatient Medications   Medication Sig    aspirin (ECOTRIN) 81 MG EC tablet Take 81 mg by mouth once daily.    coenzyme Q10 200 mg capsule Take 200 mg by mouth once daily.    fluticasone propionate (FLONASE) 50 mcg/actuation nasal spray 1 spray by Each Nostril route once daily.    lisinopriL (PRINIVIL,ZESTRIL) 40 MG tablet Take 1 tablet (40 mg total) by mouth once daily.    multivitamin (THERAGRAN) per tablet Take 1 tablet by mouth once daily.    omega-3 fatty acids/fish oil (FISH OIL-OMEGA-3 FATTY ACIDS) 300-1,000 mg capsule Take 1 capsule by mouth once daily.    ondansetron (ZOFRAN-ODT) 4 MG TbDL Take 1 tablet (4 mg total) by mouth every 6 (six) hours as needed.    oxyCODONE-acetaminophen (PERCOCET)  mg per tablet Take 1 tablet by mouth every 4 (four) hours as needed for Pain.    pantoprazole (PROTONIX) 40 MG tablet Take 1 tablet (40 mg total) by mouth once daily.    traZODone (DESYREL) 50 MG tablet Take 1 tablet (50 mg total) by mouth every evening.    vit A/vit C/vit E/zinc/copper (PRESERVISION AREDS ORAL) Take 1 tablet by mouth once daily.     No current facility-administered medications for this visit.       Review of patient's allergies indicates:   Allergen Reactions    Codeine     Sulfa (sulfonamide antibiotics)     Zonisamide        History reviewed. No pertinent family history.    Social History     Socioeconomic History    Marital status:    Occupational History    Occupation:    Tobacco Use    Smoking status: Current Every Day Smoker    Smokeless tobacco: Never Used   Substance and Sexual Activity    Alcohol use: Yes  "    Comment: SOCIAL    Drug use: Not Currently    Sexual activity: Not Currently       Chief Complaint:   Chief Complaint   Patient presents with    Left Arm - Post-op Evaluation     DOS: 03/21/2022 - 2 days s/p Ulna Nerve Transposition. Patient states Percocet keeps him awake. He also sates he has a tennis ball size bruise on his chest/rib area. Pain medication has increased his BP as well.          History of present illness:    This is a 61 y.o. year old male who complains of patient is 2 days status post ulnar nerve transposition on the left hand he has been complaining that he may have some bruising or swelling on his chest area he was told to be seen today just for follow-up    Review of Systems:    Constitution: Denies chills, fever, and sweats.  HENT: Denies headaches or blurry vision.  Cardiovascular: Denies chest pain or irregular heart beat.  Respiratory: Denies cough or shortness of breath.  Gastrointestinal: Denies abdominal pain, nausea, or vomiting.  Musculoskeletal:  Denies muscle cramps.  Neurological: Denies dizziness or focal weakness.  Psychiatric/Behavioral: Normal mental status.  Hematologic/Lymphatic: Denies bleeding problem or easy bruising/bleeding.  Skin: Denies rash or suspicious lesions.    Examination:    Vital Signs:    Vitals:    03/23/22 1445   Weight: 98.9 kg (218 lb 0.6 oz)   Height: 5' 10" (1.778 m)   PainSc:   3   PainLoc: Arm       Body mass index is 31.28 kg/m².    This a well-developed, well nourished patient in no acute distress.    Alert and oriented x 3 and cooperative to examination.       Physical Exam:  Left chest-I do not see any evidence of any hematoma or excessive swelling present or any definite mass      Left arm-he is in his posterior splint he moves his fingers well has good sensation in the fingers    Imaging:  No x-rays       Assessment: Cubital tunnel syndrome on left        Plan:  Patient is to keep his arm in a sling and follow up as " scheduled      DISCLAIMER: This note may have been dictated using voice recognition software and may contain grammatical errors.     NOTE: Consult report sent to referring provider via [facility].

## 2022-03-23 NOTE — TELEPHONE ENCOUNTER
----- Message from Barbara Bernal MA sent at 3/23/2022 11:26 AM CDT -----  Contact: pt  Chest  -- golfball size bruise   Having sleep issues   Call back

## 2022-04-01 ENCOUNTER — OFFICE VISIT (OUTPATIENT)
Dept: ORTHOPEDICS | Facility: CLINIC | Age: 62
End: 2022-04-01
Payer: OTHER GOVERNMENT

## 2022-04-01 VITALS — BODY MASS INDEX: 31.22 KG/M2 | HEIGHT: 70 IN | WEIGHT: 218.06 LBS

## 2022-04-01 DIAGNOSIS — G56.22 CUBITAL TUNNEL SYNDROME ON LEFT: Primary | ICD-10-CM

## 2022-04-01 PROCEDURE — 99213 OFFICE O/P EST LOW 20 MIN: CPT | Mod: PBBFAC,PN | Performed by: ORTHOPAEDIC SURGERY

## 2022-04-01 PROCEDURE — 99024 POSTOP FOLLOW-UP VISIT: CPT | Mod: ,,, | Performed by: ORTHOPAEDIC SURGERY

## 2022-04-01 PROCEDURE — 99999 PR PBB SHADOW E&M-EST. PATIENT-LVL III: CPT | Mod: PBBFAC,,, | Performed by: ORTHOPAEDIC SURGERY

## 2022-04-01 PROCEDURE — 99024 PR POST-OP FOLLOW-UP VISIT: ICD-10-PCS | Mod: ,,, | Performed by: ORTHOPAEDIC SURGERY

## 2022-04-01 PROCEDURE — 99999 PR PBB SHADOW E&M-EST. PATIENT-LVL III: ICD-10-PCS | Mod: PBBFAC,,, | Performed by: ORTHOPAEDIC SURGERY

## 2022-04-01 NOTE — PROGRESS NOTES
4/1/2022    Past Medical History:   Diagnosis Date    Hypertension        Past Surgical History:   Procedure Laterality Date    KNEE SURGERY      ULNAR NERVE TRANSPOSITION Left 3/21/2022    Procedure: TRANSPOSITION, NERVE, ULNAR;  Surgeon: Adama Mejia MD;  Location: [facility];  Service: Orthopedics;  Laterality: Left;       Current Outpatient Medications   Medication Sig    aspirin (ECOTRIN) 81 MG EC tablet Take 81 mg by mouth once daily.    coenzyme Q10 200 mg capsule Take 200 mg by mouth once daily.    fluticasone propionate (FLONASE) 50 mcg/actuation nasal spray 1 spray by Each Nostril route once daily.    lisinopriL (PRINIVIL,ZESTRIL) 40 MG tablet Take 1 tablet (40 mg total) by mouth once daily.    multivitamin (THERAGRAN) per tablet Take 1 tablet by mouth once daily.    omega-3 fatty acids/fish oil (FISH OIL-OMEGA-3 FATTY ACIDS) 300-1,000 mg capsule Take 1 capsule by mouth once daily.    pantoprazole (PROTONIX) 40 MG tablet Take 1 tablet (40 mg total) by mouth once daily.    traZODone (DESYREL) 50 MG tablet Take 1 tablet (50 mg total) by mouth every evening.    vit A/vit C/vit E/zinc/copper (PRESERVISION AREDS ORAL) Take 1 tablet by mouth once daily.    ondansetron (ZOFRAN-ODT) 4 MG TbDL Take 1 tablet (4 mg total) by mouth every 6 (six) hours as needed. (Patient not taking: Reported on 4/1/2022)    oxyCODONE-acetaminophen (PERCOCET)  mg per tablet Take 1 tablet by mouth every 4 (four) hours as needed for Pain. (Patient not taking: Reported on 4/1/2022)     No current facility-administered medications for this visit.       Review of patient's allergies indicates:   Allergen Reactions    Codeine     Sulfa (sulfonamide antibiotics)     Zonisamide        History reviewed. No pertinent family history.    Social History     Socioeconomic History    Marital status:    Occupational History    Occupation:    Tobacco Use    Smoking status: Current Every Day Smoker  "   Smokeless tobacco: Never Used   Substance and Sexual Activity    Alcohol use: Yes     Comment: SOCIAL    Drug use: Not Currently    Sexual activity: Not Currently       Chief Complaint:   Chief Complaint   Patient presents with    Left Elbow - Post-op Evaluation     DOS: 03/21/2022 -- 11 days S/P Left Ulnar Nerve Transposition. Patient doing well controlling pain with tylenol for first few days and did well. No pain today and splint is intact. Patient wearing sling.         History of present illness:    This is a 61 y.o. year old male who complains of the patient is now 11 days status post ulnar nerve transposition decompression he states he is doing well he is getting some sensation to his fingers now he has no pain he does not have any fever chills    Review of Systems:    Constitution: Denies chills, fever, and sweats.  HENT: Denies headaches or blurry vision.  Cardiovascular: Denies chest pain or irregular heart beat.  Respiratory: Denies cough or shortness of breath.  Gastrointestinal: Denies abdominal pain, nausea, or vomiting.  Musculoskeletal:  Denies muscle cramps.  Neurological: Denies dizziness or focal weakness.  Psychiatric/Behavioral: Normal mental status.  Hematologic/Lymphatic: Denies bleeding problem or easy bruising/bleeding.  Skin: Denies rash or suspicious lesions.    Examination:    Vital Signs:    Vitals:    04/01/22 1035   Weight: 98.9 kg (218 lb 0.6 oz)   Height: 5' 10" (1.778 m)   PainSc: 0-No pain   PainLoc: Elbow       Body mass index is 31.28 kg/m².    This a well-developed, well nourished patient in no acute distress.    Alert and oriented x 3 and cooperative to examination.       Physical Exam:  Left arm-he moves his fingers well he is neurovascularly intact he has no pain the splint is intact    Imaging:  No x-rays       Assessment: There are no diagnoses linked to this encounter.    Plan:  We will continue him in the splint for about another week he will come back in 1 week " will take out take him out of the splint let him start some range of motion exercises of the elbow      DISCLAIMER: This note may have been dictated using voice recognition software and may contain grammatical errors.     NOTE: Consult report sent to referring provider via [facility].

## 2022-04-07 DIAGNOSIS — M25.522 LEFT ELBOW PAIN: Primary | ICD-10-CM

## 2022-04-08 ENCOUNTER — OFFICE VISIT (OUTPATIENT)
Dept: ORTHOPEDICS | Facility: CLINIC | Age: 62
End: 2022-04-08
Payer: OTHER GOVERNMENT

## 2022-04-08 ENCOUNTER — HOSPITAL ENCOUNTER (OUTPATIENT)
Dept: RADIOLOGY | Facility: HOSPITAL | Age: 62
Discharge: HOME | End: 2022-04-08
Attending: ORTHOPAEDIC SURGERY
Payer: OTHER GOVERNMENT

## 2022-04-08 VITALS — HEIGHT: 70 IN | WEIGHT: 218 LBS | BODY MASS INDEX: 31.21 KG/M2

## 2022-04-08 DIAGNOSIS — M25.522 LEFT ELBOW PAIN: ICD-10-CM

## 2022-04-08 DIAGNOSIS — G56.22 CUBITAL TUNNEL SYNDROME ON LEFT: Primary | ICD-10-CM

## 2022-04-08 PROCEDURE — 99024 POSTOP FOLLOW-UP VISIT: CPT | Mod: ,,, | Performed by: ORTHOPAEDIC SURGERY

## 2022-04-08 PROCEDURE — 99213 OFFICE O/P EST LOW 20 MIN: CPT | Mod: PBBFAC,PN | Performed by: ORTHOPAEDIC SURGERY

## 2022-04-08 PROCEDURE — 99999 PR PBB SHADOW E&M-EST. PATIENT-LVL III: CPT | Mod: PBBFAC,,, | Performed by: ORTHOPAEDIC SURGERY

## 2022-04-08 PROCEDURE — 73080 XR ELBOW COMPLETE 3 VIEW LEFT: ICD-10-PCS | Mod: 26,LT,, | Performed by: RADIOLOGY

## 2022-04-08 PROCEDURE — 99024 PR POST-OP FOLLOW-UP VISIT: ICD-10-PCS | Mod: ,,, | Performed by: ORTHOPAEDIC SURGERY

## 2022-04-08 PROCEDURE — 73080 X-RAY EXAM OF ELBOW: CPT | Mod: 26,LT,, | Performed by: RADIOLOGY

## 2022-04-08 PROCEDURE — 73080 X-RAY EXAM OF ELBOW: CPT | Mod: TC,PN,LT

## 2022-04-08 PROCEDURE — 99999 PR PBB SHADOW E&M-EST. PATIENT-LVL III: ICD-10-PCS | Mod: PBBFAC,,, | Performed by: ORTHOPAEDIC SURGERY

## 2022-04-08 NOTE — PROGRESS NOTES
4/8/2022    Past Medical History:   Diagnosis Date    Hypertension        Past Surgical History:   Procedure Laterality Date    KNEE SURGERY      ULNAR NERVE TRANSPOSITION Left 3/21/2022    Procedure: TRANSPOSITION, NERVE, ULNAR;  Surgeon: Adama Mejia MD;  Location: [facility];  Service: Orthopedics;  Laterality: Left;       Current Outpatient Medications   Medication Sig    aspirin (ECOTRIN) 81 MG EC tablet Take 81 mg by mouth once daily.    coenzyme Q10 200 mg capsule Take 200 mg by mouth once daily.    fluticasone propionate (FLONASE) 50 mcg/actuation nasal spray 1 spray by Each Nostril route once daily.    lisinopriL (PRINIVIL,ZESTRIL) 40 MG tablet Take 1 tablet (40 mg total) by mouth once daily.    multivitamin (THERAGRAN) per tablet Take 1 tablet by mouth once daily.    omega-3 fatty acids/fish oil (FISH OIL-OMEGA-3 FATTY ACIDS) 300-1,000 mg capsule Take 1 capsule by mouth once daily.    pantoprazole (PROTONIX) 40 MG tablet Take 1 tablet (40 mg total) by mouth once daily.    traZODone (DESYREL) 50 MG tablet Take 1 tablet (50 mg total) by mouth every evening.    vit A/vit C/vit E/zinc/copper (PRESERVISION AREDS ORAL) Take 1 tablet by mouth once daily.    ondansetron (ZOFRAN-ODT) 4 MG TbDL Take 1 tablet (4 mg total) by mouth every 6 (six) hours as needed. (Patient not taking: Reported on 4/8/2022)    oxyCODONE-acetaminophen (PERCOCET)  mg per tablet Take 1 tablet by mouth every 4 (four) hours as needed for Pain.     No current facility-administered medications for this visit.       Review of patient's allergies indicates:   Allergen Reactions    Codeine     Sulfa (sulfonamide antibiotics)     Zonisamide        No family history on file.    Social History     Socioeconomic History    Marital status:    Occupational History    Occupation:    Tobacco Use    Smoking status: Current Every Day Smoker    Smokeless tobacco: Never Used   Substance and Sexual Activity  "   Alcohol use: Yes     Comment: SOCIAL    Drug use: Not Currently    Sexual activity: Not Currently       Chief Complaint:   Chief Complaint   Patient presents with    Left Elbow - Pain     Lt CTR DOS 03/21         History of present illness:    This is a 61 y.o. year old male who complains of the patient is almost 3 weeks status post ulnar nerve transposition he says he is having increased sensation to his fingers and less pain in his little finger    Review of Systems:    Constitution: Denies chills, fever, and sweats.  HENT: Denies headaches or blurry vision.  Cardiovascular: Denies chest pain or irregular heart beat.  Respiratory: Denies cough or shortness of breath.  Gastrointestinal: Denies abdominal pain, nausea, or vomiting.  Musculoskeletal:  Denies muscle cramps.  Neurological: Denies dizziness or focal weakness.  Psychiatric/Behavioral: Normal mental status.  Hematologic/Lymphatic: Denies bleeding problem or easy bruising/bleeding.  Skin: Denies rash or suspicious lesions.    Examination:    Vital Signs:    Vitals:    04/08/22 0948   Weight: 98.9 kg (218 lb)   Height: 5' 10" (1.778 m)   PainSc: 0-No pain       Body mass index is 31.28 kg/m².    This a well-developed, well nourished patient in no acute distress.    Alert and oriented x 3 and cooperative to examination.       Physical Exam:  Left elbow-incision is healing well he has some minor bruising he moves his fingers well appears have increased sensation is 5th finger    Imaging:  No x-rays       Assessment: Cubital tunnel syndrome on left        Plan:  The patient is going to stay in his sling and continue some gentle range of motion of his elbow and I will see him back in 3 weeks      DISCLAIMER: This note may have been dictated using voice recognition software and may contain grammatical errors.     NOTE: Consult report sent to referring provider via [facility] [facility].    "

## 2022-04-11 ENCOUNTER — PATIENT MESSAGE (OUTPATIENT)
Dept: FAMILY MEDICINE | Facility: CLINIC | Age: 62
End: 2022-04-11
Payer: OTHER GOVERNMENT

## 2022-04-23 LAB
5OH-INDOLEACETATE 24H UR-MRATE: 1.6 MG/24 H
CREAT 24H UR-MRATE: 0.69 G/24 H (ref 0.5–2.15)
SPECIMEN VOL 24H UR: 1000 ML

## 2022-04-29 ENCOUNTER — OFFICE VISIT (OUTPATIENT)
Dept: ORTHOPEDICS | Facility: CLINIC | Age: 62
End: 2022-04-29
Payer: OTHER GOVERNMENT

## 2022-04-29 ENCOUNTER — HOSPITAL ENCOUNTER (OUTPATIENT)
Dept: RADIOLOGY | Facility: HOSPITAL | Age: 62
Discharge: HOME | End: 2022-04-29
Attending: ORTHOPAEDIC SURGERY
Payer: OTHER GOVERNMENT

## 2022-04-29 VITALS — WEIGHT: 218 LBS | HEIGHT: 70 IN | BODY MASS INDEX: 31.21 KG/M2 | RESPIRATION RATE: 16 BRPM

## 2022-04-29 DIAGNOSIS — G56.22 CUBITAL TUNNEL SYNDROME ON LEFT: Primary | ICD-10-CM

## 2022-04-29 DIAGNOSIS — G56.22 CUBITAL TUNNEL SYNDROME ON LEFT: ICD-10-CM

## 2022-04-29 PROCEDURE — 99024 PR POST-OP FOLLOW-UP VISIT: ICD-10-PCS | Mod: ,,, | Performed by: ORTHOPAEDIC SURGERY

## 2022-04-29 PROCEDURE — 99999 PR PBB SHADOW E&M-EST. PATIENT-LVL III: ICD-10-PCS | Mod: PBBFAC,,, | Performed by: ORTHOPAEDIC SURGERY

## 2022-04-29 PROCEDURE — 99999 PR PBB SHADOW E&M-EST. PATIENT-LVL III: CPT | Mod: PBBFAC,,, | Performed by: ORTHOPAEDIC SURGERY

## 2022-04-29 PROCEDURE — 73080 XR ELBOW COMPLETE 3 VIEW LEFT: ICD-10-PCS | Mod: 26,LT,, | Performed by: RADIOLOGY

## 2022-04-29 PROCEDURE — 99213 OFFICE O/P EST LOW 20 MIN: CPT | Mod: PBBFAC,PN | Performed by: ORTHOPAEDIC SURGERY

## 2022-04-29 PROCEDURE — 73080 X-RAY EXAM OF ELBOW: CPT | Mod: 26,LT,, | Performed by: RADIOLOGY

## 2022-04-29 PROCEDURE — 73080 X-RAY EXAM OF ELBOW: CPT | Mod: TC,PN,LT

## 2022-04-29 PROCEDURE — 99024 POSTOP FOLLOW-UP VISIT: CPT | Mod: ,,, | Performed by: ORTHOPAEDIC SURGERY

## 2022-04-29 NOTE — PROGRESS NOTES
4/29/2022    Past Medical History:   Diagnosis Date    Hypertension        Past Surgical History:   Procedure Laterality Date    KNEE SURGERY      ULNAR NERVE TRANSPOSITION Left 3/21/2022    Procedure: TRANSPOSITION, NERVE, ULNAR;  Surgeon: Adama Mejia MD;  Location: [facility];  Service: Orthopedics;  Laterality: Left;       Current Outpatient Medications   Medication Sig    aspirin (ECOTRIN) 81 MG EC tablet Take 81 mg by mouth once daily.    coenzyme Q10 200 mg capsule Take 200 mg by mouth once daily.    fluticasone propionate (FLONASE) 50 mcg/actuation nasal spray 1 spray by Each Nostril route once daily.    lisinopriL (PRINIVIL,ZESTRIL) 40 MG tablet Take 1 tablet (40 mg total) by mouth once daily.    multivitamin (THERAGRAN) per tablet Take 1 tablet by mouth once daily.    omega-3 fatty acids/fish oil (FISH OIL-OMEGA-3 FATTY ACIDS) 300-1,000 mg capsule Take 1 capsule by mouth once daily.    ondansetron (ZOFRAN-ODT) 4 MG TbDL Take 1 tablet (4 mg total) by mouth every 6 (six) hours as needed. (Patient not taking: Reported on 4/8/2022)    oxyCODONE-acetaminophen (PERCOCET)  mg per tablet Take 1 tablet by mouth every 4 (four) hours as needed for Pain.    pantoprazole (PROTONIX) 40 MG tablet Take 1 tablet (40 mg total) by mouth once daily.    traZODone (DESYREL) 50 MG tablet Take 1 tablet (50 mg total) by mouth every evening.    vit A/vit C/vit E/zinc/copper (PRESERVISION AREDS ORAL) Take 1 tablet by mouth once daily.     No current facility-administered medications for this visit.       Review of patient's allergies indicates:   Allergen Reactions    Codeine     Sulfa (sulfonamide antibiotics)     Zonisamide        History reviewed. No pertinent family history.    Social History     Socioeconomic History    Marital status:    Occupational History    Occupation:    Tobacco Use    Smoking status: Current Every Day Smoker    Smokeless tobacco: Never Used  "  Substance and Sexual Activity    Alcohol use: Yes     Comment: SOCIAL    Drug use: Not Currently    Sexual activity: Not Currently       Chief Complaint:   Chief Complaint   Patient presents with    Left Elbow - Post-op Evaluation     DOS: 03/21/2022-- 4 weeks s/p Left Ulnar Transposition. Pt denies having any pain in his elbow or arm today. No longer taking anything for pain. Overall patient states he is doing well.          History of present illness:    This is a 61 y.o. year old male who complains of the patient is now about 5 weeks status post ulnar nerve transposition he denies any pain in his elbow at this time no longer taking any pain medicines he says he is doing well    Review of Systems:    Constitution: Denies chills, fever, and sweats.  HENT: Denies headaches or blurry vision.  Cardiovascular: Denies chest pain or irregular heart beat.  Respiratory: Denies cough or shortness of breath.  Gastrointestinal: Denies abdominal pain, nausea, or vomiting.  Musculoskeletal:  Denies muscle cramps.  Neurological: Denies dizziness or focal weakness.  Psychiatric/Behavioral: Normal mental status.  Hematologic/Lymphatic: Denies bleeding problem or easy bruising/bleeding.  Skin: Denies rash or suspicious lesions.    Examination:    Vital Signs:    Vitals:    04/29/22 1103   Resp: 16   Weight: 98.9 kg (218 lb)   Height: 5' 10" (1.778 m)   PainSc: 0-No pain   PainLoc: Elbow       Body mass index is 31.28 kg/m².    This a well-developed, well nourished patient in no acute distress.    Alert and oriented x 3 and cooperative to examination.       Physical Exam:  Left elbow-patient has excellent range of motion of left elbow has increased sensation to the 4th and 5th fingers has no pain    Imaging:  No x-rays       Assessment: Cubital tunnel syndrome on left        Plan:  Patient is continue range of motion and activity with the arm will check him back in about 3 months      DISCLAIMER: This note may have been " dictated using voice recognition software and may contain grammatical errors.     NOTE: Consult report sent to referring provider via [employer] E[employer].

## 2022-05-02 ENCOUNTER — PATIENT MESSAGE (OUTPATIENT)
Dept: ADMINISTRATIVE | Facility: OTHER | Age: 62
End: 2022-05-02
Payer: OTHER GOVERNMENT

## 2022-05-16 ENCOUNTER — PATIENT MESSAGE (OUTPATIENT)
Dept: FAMILY MEDICINE | Facility: CLINIC | Age: 62
End: 2022-05-16
Payer: OTHER GOVERNMENT

## 2022-05-23 ENCOUNTER — OFFICE VISIT (OUTPATIENT)
Dept: PULMONOLOGY | Facility: CLINIC | Age: 62
End: 2022-05-23
Payer: OTHER GOVERNMENT

## 2022-05-23 VITALS
HEART RATE: 68 BPM | HEIGHT: 70 IN | WEIGHT: 224 LBS | SYSTOLIC BLOOD PRESSURE: 140 MMHG | BODY MASS INDEX: 32.07 KG/M2 | OXYGEN SATURATION: 98 % | DIASTOLIC BLOOD PRESSURE: 80 MMHG

## 2022-05-23 DIAGNOSIS — G47.00 INSOMNIA, UNSPECIFIED TYPE: ICD-10-CM

## 2022-05-23 PROCEDURE — 99244 PR OFFICE CONSULTATION,LEVEL IV: ICD-10-PCS | Mod: S$GLB,,, | Performed by: INTERNAL MEDICINE

## 2022-05-23 PROCEDURE — 99244 OFF/OP CNSLTJ NEW/EST MOD 40: CPT | Mod: S$GLB,,, | Performed by: INTERNAL MEDICINE

## 2022-05-23 RX ORDER — CELECOXIB 100 MG/1
1 CAPSULE ORAL 2 TIMES DAILY
COMMUNITY
Start: 2021-09-07 | End: 2023-05-16

## 2022-05-23 NOTE — PROGRESS NOTES
SUBJECTIVE:    Patient ID: Isai Mello is a 61 y.o. male.    Chief Complaint: Establish Care (Insomnia)    HPI The patient is referred for insomnia.  He has been having insomnia since February.  He goes to bed between 9:30 and 10:30.  Falls asleep in 20-30 minutes and sleeps til 7.  He is taking Trazadone 50mg and Melatonin 10mg to correct his sleep onset insomnia.    Past Medical History:   Diagnosis Date    Hypertension      Past Surgical History:   Procedure Laterality Date    KNEE SURGERY      ULNAR NERVE TRANSPOSITION Left 3/21/2022    Procedure: TRANSPOSITION, NERVE, ULNAR;  Surgeon: Adama Mejia MD;  Location: [facility];  Service: Orthopedics;  Laterality: Left;     No family history on file.     Social History:   Marital Status:   Occupation: Data Unavailable  Alcohol History:  reports current alcohol use.  Tobacco History:  reports that he has been smoking cigarettes. He started smoking about 39 years ago. He has been smoking about 0.50 packs per day. He has never used smokeless tobacco.  Drug History:  reports previous drug use.    Review of patient's allergies indicates:   Allergen Reactions    Codeine     Sulfa (sulfonamide antibiotics)     Zonisamide        Current Outpatient Medications   Medication Sig Dispense Refill    aspirin (ECOTRIN) 81 MG EC tablet Take 81 mg by mouth once daily.      coenzyme Q10 200 mg capsule Take 200 mg by mouth once daily.      fluticasone propionate (FLONASE) 50 mcg/actuation nasal spray 1 spray by Each Nostril route once daily.      lisinopriL (PRINIVIL,ZESTRIL) 40 MG tablet Take 1 tablet (40 mg total) by mouth once daily. 90 tablet 1    multivitamin (THERAGRAN) per tablet Take 1 tablet by mouth once daily.      omega-3 fatty acids/fish oil (FISH OIL-OMEGA-3 FATTY ACIDS) 300-1,000 mg capsule Take 1 capsule by mouth once daily.      pantoprazole (PROTONIX) 40 MG tablet Take 1 tablet (40 mg total) by mouth once daily. 90 tablet 1     "traZODone (DESYREL) 50 MG tablet Take 1 tablet (50 mg total) by mouth every evening. 30 tablet 2    vit A/vit C/vit E/zinc/copper (PRESERVISION AREDS ORAL) Take 1 tablet by mouth once daily.      celecoxib (CELEBREX) 100 MG capsule Take 1 capsule by mouth 2 (two) times daily.      ondansetron (ZOFRAN-ODT) 4 MG TbDL Take 1 tablet (4 mg total) by mouth every 6 (six) hours as needed. (Patient not taking: No sig reported) 20 tablet 1    oxyCODONE-acetaminophen (PERCOCET)  mg per tablet Take 1 tablet by mouth every 4 (four) hours as needed for Pain. (Patient not taking: Reported on 5/23/2022) 30 tablet 0     No current facility-administered medications for this visit.       Alpha-1 Antitrypsin:  Last PFT:   Last CT:    Review of Systems  General: Feeling Well.  Eyes: Vision is good.  ENT:  No sinusitis or pharyngitis.   Heart:: No chest pain or palpitations.  Lungs: No cough, sputum, or wheezing.  GI: No Nausea, vomiting, constipation, diarrhea, or reflux.  : No dysuria, hesitancy, or nocturia.  Musculoskeletal: No joint pain or myalgias.  Skin: No lesions or rashes.  Neuro: No headaches or neuropathy.  Lymph: No edema or adenopathy.  Psych: No anxiety or depression.  Endo: No weight change.    OBJECTIVE:      BP (!) 140/80 (BP Location: Left arm, Patient Position: Sitting, BP Method: Medium (Manual))   Pulse 68   Ht 5' 10" (1.778 m)   Wt 101.6 kg (224 lb)   SpO2 98%   BMI 32.14 kg/m²     Physical Exam  GENERAL: Older patient in no distress.  HEENT: Pupils equal and reactive. Extraocular movements intact. Nose intact.      Pharynx moist.  NECK: Supple.   HEART: Regular rate and rhythm. No murmur or gallop auscultated.  LUNGS: Clear to auscultation and percussion. Lung excursion symmetrical. No change in fremitus. No adventitial noises.  ABDOMEN: Bowel sounds present. Non-tender, no masses palpated.  EXTREMITIES: Normal muscle tone and joint movement, no cyanosis or clubbing.   LYMPHATICS: No adenopathy " palpated, no edema.  SKIN: Dry, intact, no lesions.   NEURO: Cranial nerves II-XII intact. Motor strength 5/5 bilaterally, upper and lower extremities.  PSYCH: Appropriate affect.    Assessment:       1. Insomnia, unspecified type        Patient is sleeping from 9:30 p.m. to 7:00 a.m. with a 130 0.5 hr awakening.  This is fine.  Explained that if he has an occasional night where he does not sleep that is also okay.  Plan:       Insomnia, unspecified type  -     Ambulatory referral/consult to Sleep Disorders       Continue trazodone and as needed melatonin.  Follow up if symptoms worsen or fail to improve.

## 2022-05-25 RX ORDER — TRAZODONE HYDROCHLORIDE 50 MG/1
TABLET ORAL
Qty: 30 TABLET | Refills: 2 | Status: SHIPPED | OUTPATIENT
Start: 2022-05-25 | End: 2022-07-25

## 2022-05-25 NOTE — TELEPHONE ENCOUNTER
Refill Routing Note   Medication(s) are not appropriate for processing by [facility] for the following reason(s):      - Medication is a new start (<3 months)    [facility] action(s):  Defer       Medication Therapy Plan: not a true ed visit on 3/21/22  Medication reconciliation completed: No     Appointments  past 12m or future 3m with PCP    Date Provider   Last Visit   3/4/2022 Brian Oviedo III, MD   Next Visit   Visit date not found Brian Oviedo III, MD   ED visits in past 90 days: 0        Note composed:10:29 AM 05/25/2022

## 2022-05-25 NOTE — TELEPHONE ENCOUNTER
No new care gaps identified.  [facility] Embedded Care Gaps. Reference number: [id number]. 5/25/2022   3:49:46 AM ZAFART

## 2022-06-02 RX ORDER — PANTOPRAZOLE SODIUM 40 MG/1
TABLET, DELAYED RELEASE ORAL
Qty: 90 TABLET | Refills: 1 | Status: SHIPPED | OUTPATIENT
Start: 2022-06-02 | End: 2022-09-04

## 2022-06-02 NOTE — TELEPHONE ENCOUNTER
No new care gaps identified.  [facility] Embedded Care Gaps. Reference number: [id number]. 6/02/2022   8:08:05 AM ZAFART

## 2022-06-02 NOTE — TELEPHONE ENCOUNTER
Refill Routing Note   Medication(s) are not appropriate for processing by [facility] for the following reason(s):      - Patient has been seen in the ED/Hospital since the last PCP visit    [facility] action(s):  Defer          Medication reconciliation completed: No     Appointments  past 12m or future 3m with PCP    Date Provider   Last Visit   3/4/2022 Brian Oviedo III, MD   Next Visit   Visit date not found Brian Oviedo III, MD   ED visits in past 90 days: 0        Note composed:12:19 PM 06/02/2022

## 2022-06-19 ENCOUNTER — PATIENT MESSAGE (OUTPATIENT)
Dept: ADMINISTRATIVE | Facility: OTHER | Age: 62
End: 2022-06-19
Payer: OTHER GOVERNMENT

## 2022-07-06 ENCOUNTER — TELEPHONE (OUTPATIENT)
Dept: FAMILY MEDICINE | Facility: CLINIC | Age: 62
End: 2022-07-06
Payer: OTHER GOVERNMENT

## 2022-07-06 ENCOUNTER — PATIENT MESSAGE (OUTPATIENT)
Dept: FAMILY MEDICINE | Facility: CLINIC | Age: 62
End: 2022-07-06
Payer: OTHER GOVERNMENT

## 2022-07-06 DIAGNOSIS — H91.90 HEARING LOSS, UNSPECIFIED HEARING LOSS TYPE, UNSPECIFIED LATERALITY: Primary | ICD-10-CM

## 2022-07-06 DIAGNOSIS — Z01.00 ROUTINE EYE EXAM: ICD-10-CM

## 2022-07-06 NOTE — TELEPHONE ENCOUNTER
----- Message from Nick Gonzalez LPN sent at 7/6/2022  1:21 PM CDT -----  I have 2 ref put in for patient. Dr jimenez is located in [place] also but it wasn't in the system. The ear doctor for his hearing aid isnt in the system at all     [address]  [address] [place] [address]  P: [phone]  F: [phone]     he has appts coming up and has . Can someone please get it approved. Thanks

## 2022-07-14 ENCOUNTER — PATIENT MESSAGE (OUTPATIENT)
Dept: ORTHOPEDICS | Facility: CLINIC | Age: 62
End: 2022-07-14
Payer: OTHER GOVERNMENT

## 2022-07-14 NOTE — LETTER
July 15, 2022    Isai Mello  [address] [facility] [address]             [facility]  [facility] [address]  Phone: [phone] To Whom It May Concern,    Patient underwent a surgical procedure on his left elbow by Dr. Mejia that resulted in a 4 inch scar on the left elbow. Surgical procedure was performed on 03/21/2022 description of surgery was  TRANSPOSITION, NERVE, ULNAR (Left Elbow). If you have any further questions you can contact our office.      Kind Regards,        Adama Mejia MD

## 2022-07-15 ENCOUNTER — PATIENT MESSAGE (OUTPATIENT)
Dept: ORTHOPEDICS | Facility: CLINIC | Age: 62
End: 2022-07-15
Payer: OTHER GOVERNMENT

## 2022-07-28 DIAGNOSIS — M25.522 LEFT ELBOW PAIN: Primary | ICD-10-CM

## 2022-07-29 ENCOUNTER — HOSPITAL ENCOUNTER (OUTPATIENT)
Dept: RADIOLOGY | Facility: HOSPITAL | Age: 62
Discharge: HOME | End: 2022-07-29
Attending: ORTHOPAEDIC SURGERY
Payer: OTHER GOVERNMENT

## 2022-07-29 ENCOUNTER — OFFICE VISIT (OUTPATIENT)
Dept: ORTHOPEDICS | Facility: CLINIC | Age: 62
End: 2022-07-29
Payer: OTHER GOVERNMENT

## 2022-07-29 VITALS — WEIGHT: 224 LBS | BODY MASS INDEX: 32.07 KG/M2 | HEIGHT: 70 IN

## 2022-07-29 DIAGNOSIS — G56.22 CUBITAL TUNNEL SYNDROME ON LEFT: Primary | ICD-10-CM

## 2022-07-29 DIAGNOSIS — M25.522 LEFT ELBOW PAIN: ICD-10-CM

## 2022-07-29 PROCEDURE — 99999 PR PBB SHADOW E&M-EST. PATIENT-LVL III: ICD-10-PCS | Mod: PBBFAC,,, | Performed by: ORTHOPAEDIC SURGERY

## 2022-07-29 PROCEDURE — 99213 PR OFFICE/OUTPT VISIT, EST, LEVL III, 20-29 MIN: ICD-10-PCS | Mod: S$PBB,,, | Performed by: ORTHOPAEDIC SURGERY

## 2022-07-29 PROCEDURE — 73080 X-RAY EXAM OF ELBOW: CPT | Mod: TC,PN,LT

## 2022-07-29 PROCEDURE — 99213 OFFICE O/P EST LOW 20 MIN: CPT | Mod: S$PBB,,, | Performed by: ORTHOPAEDIC SURGERY

## 2022-07-29 PROCEDURE — 99213 OFFICE O/P EST LOW 20 MIN: CPT | Mod: PBBFAC,PN | Performed by: ORTHOPAEDIC SURGERY

## 2022-07-29 PROCEDURE — 73080 XR ELBOW COMPLETE 3 VIEW LEFT: ICD-10-PCS | Mod: 26,LT,, | Performed by: RADIOLOGY

## 2022-07-29 PROCEDURE — 73080 X-RAY EXAM OF ELBOW: CPT | Mod: 26,LT,, | Performed by: RADIOLOGY

## 2022-07-29 PROCEDURE — 99999 PR PBB SHADOW E&M-EST. PATIENT-LVL III: CPT | Mod: PBBFAC,,, | Performed by: ORTHOPAEDIC SURGERY

## 2022-07-29 NOTE — PROGRESS NOTES
7/29/2022    Past Medical History:   Diagnosis Date    Hypertension        Past Surgical History:   Procedure Laterality Date    KNEE SURGERY      ULNAR NERVE TRANSPOSITION Left 3/21/2022    Procedure: TRANSPOSITION, NERVE, ULNAR;  Surgeon: Adama Mejia MD;  Location: [facility];  Service: Orthopedics;  Laterality: Left;       Current Outpatient Medications   Medication Sig    aspirin (ECOTRIN) 81 MG EC tablet Take 81 mg by mouth once daily.    celecoxib (CELEBREX) 100 MG capsule Take 1 capsule by mouth 2 (two) times daily.    coenzyme Q10 200 mg capsule Take 200 mg by mouth once daily.    fluticasone propionate (FLONASE) 50 mcg/actuation nasal spray 1 spray by Each Nostril route once daily.    lisinopriL (PRINIVIL,ZESTRIL) 40 MG tablet Take 1 tablet (40 mg total) by mouth once daily.    multivitamin (THERAGRAN) per tablet Take 1 tablet by mouth once daily.    omega-3 fatty acids/fish oil (FISH OIL-OMEGA-3 FATTY ACIDS) 300-1,000 mg capsule Take 1 capsule by mouth once daily.    ondansetron (ZOFRAN-ODT) 4 MG TbDL Take 1 tablet (4 mg total) by mouth every 6 (six) hours as needed.    oxyCODONE-acetaminophen (PERCOCET)  mg per tablet Take 1 tablet by mouth every 4 (four) hours as needed for Pain.    pantoprazole (PROTONIX) 40 MG tablet TAKE 1 TABLET(40 MG) BY MOUTH EVERY DAY    traZODone (DESYREL) 50 MG tablet TAKE 1 TABLET(50 MG) BY MOUTH EVERY EVENING    vit A/vit C/vit E/zinc/copper (PRESERVISION AREDS ORAL) Take 1 tablet by mouth once daily.     No current facility-administered medications for this visit.       Review of patient's allergies indicates:   Allergen Reactions    Codeine     Sulfa (sulfonamide antibiotics)     Zonisamide        History reviewed. No pertinent family history.    Social History     Socioeconomic History    Marital status:    Occupational History    Occupation:    Tobacco Use    Smoking status: Current Every Day Smoker     Packs/day: 0.50  "    Types: Cigarettes     Start date: 2/23/1983    Smokeless tobacco: Never Used    Tobacco comment: half a pack   Substance and Sexual Activity    Alcohol use: Yes     Comment: SOCIAL    Drug use: Not Currently    Sexual activity: Not Currently       Chief Complaint:   Chief Complaint   Patient presents with    Left Elbow - Pain     DOS: 03/21/2022 - 4 mths s/p  Left Elbow Ulnar Transposition. Patient states no pain. He has good ROM. He feels his small finger gets a little tight on occasion.           History of present illness:    This is a 62 y.o. year old male who complains of the patient is now 4 months status post ulnar nerve transposition patient denies any pain he has is he has good range of motion .  Complains of some stiffness in his little finger    Review of Systems:    Constitution: Denies chills, fever, and sweats.  HENT: Denies headaches or blurry vision.  Cardiovascular: Denies chest pain or irregular heart beat.  Respiratory: Denies cough or shortness of breath.  Gastrointestinal: Denies abdominal pain, nausea, or vomiting.  Musculoskeletal:  Denies muscle cramps.  Neurological: Denies dizziness or focal weakness.  Psychiatric/Behavioral: Normal mental status.  Hematologic/Lymphatic: Denies bleeding problem or easy bruising/bleeding.  Skin: Denies rash or suspicious lesions.    Examination:    Vital Signs:    Vitals:    07/29/22 1050   Weight: 101.6 kg (223 lb 15.8 oz)   Height: 5' 10" (1.778 m)   PainSc: 0-No pain   PainLoc: Elbow       Body mass index is 32.14 kg/m².    This a well-developed, well nourished patient in no acute distress.    Alert and oriented x 3 and cooperative to examination.       Physical Exam:  Left elbow-patient has full range of motion of the elbow no pain at the incision site      Left hand-patient has excellent hand function has increased sensation to the little finger has full range of motion of the finger has no further atrophy of the interosseous muscles although " he had atrophy prior to the surgery his hand function appears to be improving    Imaging:  No x-rays       Assessment: Cubital tunnel syndrome on left        Plan:  Patient can increase his activity as tolerated return as needed      DISCLAIMER: This note may have been dictated using voice recognition software and may contain grammatical errors.     NOTE: Consult report sent to referring provider via [email or web address] EMR.

## 2022-08-26 ENCOUNTER — PATIENT MESSAGE (OUTPATIENT)
Dept: FAMILY MEDICINE | Facility: CLINIC | Age: 62
End: 2022-08-26
Payer: OTHER GOVERNMENT

## 2022-09-17 ENCOUNTER — PATIENT MESSAGE (OUTPATIENT)
Dept: ADMINISTRATIVE | Facility: OTHER | Age: 62
End: 2022-09-17
Payer: OTHER GOVERNMENT

## 2022-09-20 ENCOUNTER — PATIENT MESSAGE (OUTPATIENT)
Dept: FAMILY MEDICINE | Facility: CLINIC | Age: 62
End: 2022-09-20
Payer: OTHER GOVERNMENT

## 2022-09-21 ENCOUNTER — TELEPHONE (OUTPATIENT)
Dept: FAMILY MEDICINE | Facility: CLINIC | Age: 62
End: 2022-09-21
Payer: OTHER GOVERNMENT

## 2022-09-22 NOTE — TELEPHONE ENCOUNTER
----- Message from Eleanor Moncada sent at 9/20/2022  3:43 PM CDT -----  Type:  Patient Returning Call    Who Called:pt   Who Left Message for Patient: pt  Does the patient know what this is regarding?: pt need a call from the office   Would the patient rather a call back or a response via [facility]?  Call   Best Call Back Number:[phone]  Additional Information:  call back

## 2022-12-06 RX ORDER — PANTOPRAZOLE SODIUM 40 MG/1
TABLET, DELAYED RELEASE ORAL
Qty: 90 TABLET | OUTPATIENT
Start: 2022-12-06

## 2022-12-07 NOTE — TELEPHONE ENCOUNTER
Quick DC. Request already responded to by other means (e.g. phone or fax)   Refill Authorization Note   Isai Mello  is requesting a refill authorization.  Brief Assessment and Rationale for Refill:  Quick Discontinue  Medication Therapy Plan:  Signed 12/5/22    Medication Reconciliation Completed:  No      Comments:     Note composed:9:32 PM 12/06/2022

## 2023-03-07 LAB — PSA: 1.79

## 2023-04-03 ENCOUNTER — PATIENT MESSAGE (OUTPATIENT)
Dept: FAMILY MEDICINE | Facility: CLINIC | Age: 63
End: 2023-04-03
Payer: OTHER GOVERNMENT

## 2023-05-16 ENCOUNTER — OFFICE VISIT (OUTPATIENT)
Dept: FAMILY MEDICINE | Facility: CLINIC | Age: 63
End: 2023-05-16
Payer: OTHER GOVERNMENT

## 2023-05-16 VITALS
BODY MASS INDEX: 35.17 KG/M2 | TEMPERATURE: 97 F | WEIGHT: 245.63 LBS | HEART RATE: 62 BPM | HEIGHT: 70 IN | OXYGEN SATURATION: 96 % | DIASTOLIC BLOOD PRESSURE: 72 MMHG | SYSTOLIC BLOOD PRESSURE: 124 MMHG

## 2023-05-16 DIAGNOSIS — F17.210 SMOKES WITH GREATER THAN 30 PACK YEAR HISTORY: ICD-10-CM

## 2023-05-16 DIAGNOSIS — D49.2 NEOPLASM OF EAR: ICD-10-CM

## 2023-05-16 DIAGNOSIS — I10 HYPERTENSION, ESSENTIAL: ICD-10-CM

## 2023-05-16 DIAGNOSIS — K21.9 GASTROESOPHAGEAL REFLUX DISEASE, UNSPECIFIED WHETHER ESOPHAGITIS PRESENT: ICD-10-CM

## 2023-05-16 DIAGNOSIS — Z00.00 PHYSICAL EXAM: Primary | ICD-10-CM

## 2023-05-16 DIAGNOSIS — M17.0 OSTEOARTHRITIS OF BOTH KNEES, UNSPECIFIED OSTEOARTHRITIS TYPE: ICD-10-CM

## 2023-05-16 DIAGNOSIS — G47.00 INSOMNIA, UNSPECIFIED TYPE: ICD-10-CM

## 2023-05-16 DIAGNOSIS — D49.89 NEOPLASM OF NECK: ICD-10-CM

## 2023-05-16 PROCEDURE — 90677 PCV20 VACCINE IM: CPT | Mod: S$GLB,,, | Performed by: FAMILY MEDICINE

## 2023-05-16 PROCEDURE — 90471 PNEUMOCOCCAL CONJUGATE VACCINE 20-VALENT: ICD-10-PCS | Mod: S$GLB,,, | Performed by: FAMILY MEDICINE

## 2023-05-16 PROCEDURE — 99396 PREV VISIT EST AGE 40-64: CPT | Mod: 25,S$GLB,, | Performed by: FAMILY MEDICINE

## 2023-05-16 PROCEDURE — 99212 OFFICE O/P EST SF 10 MIN: CPT | Mod: 25,S$GLB,, | Performed by: FAMILY MEDICINE

## 2023-05-16 PROCEDURE — 90471 IMMUNIZATION ADMIN: CPT | Mod: S$GLB,,, | Performed by: FAMILY MEDICINE

## 2023-05-16 PROCEDURE — 90677 PNEUMOCOCCAL CONJUGATE VACCINE 20-VALENT: ICD-10-PCS | Mod: S$GLB,,, | Performed by: FAMILY MEDICINE

## 2023-05-16 PROCEDURE — 99212 PR OFFICE/OUTPT VISIT, EST, LEVL II, 10-19 MIN: ICD-10-PCS | Mod: 25,S$GLB,, | Performed by: FAMILY MEDICINE

## 2023-05-16 PROCEDURE — 99396 PR PREVENTIVE VISIT,EST,40-64: ICD-10-PCS | Mod: 25,S$GLB,, | Performed by: FAMILY MEDICINE

## 2023-05-16 RX ORDER — PANTOPRAZOLE SODIUM 40 MG/1
40 TABLET, DELAYED RELEASE ORAL DAILY
Qty: 90 TABLET | Refills: 1 | Status: SHIPPED | OUTPATIENT
Start: 2023-05-16 | End: 2024-01-23 | Stop reason: SDUPTHER

## 2023-05-16 RX ORDER — LISINOPRIL 40 MG/1
40 TABLET ORAL DAILY
Qty: 90 TABLET | Refills: 1 | Status: SHIPPED | OUTPATIENT
Start: 2023-05-16 | End: 2024-01-23 | Stop reason: SDUPTHER

## 2023-05-16 RX ORDER — DICLOFENAC SODIUM 10 MG/G
GEL TOPICAL
COMMUNITY
Start: 2023-02-27

## 2023-05-20 NOTE — PROGRESS NOTES
Subjective:       Patient ID: Isai Mello is a 62 y.o. male.    Chief Complaint: Annual Exam    Here for annual physical.  Half pack per day smoker has smoked for many years.  Has total of over 30 pack years of smoking in.  No alcohol.  Does walk for exercise.      Family history no changes.    Immunizations and care gaps were reviewed.    Past medical history.  Hypertension.  BMI 35.  Gastroesophageal reflux disease.    Review of Systems   Constitutional: Negative.    HENT: Negative.     Eyes: Negative.    Respiratory: Negative.     Cardiovascular: Negative.    Gastrointestinal: Negative.    Endocrine: Negative.    Genitourinary: Negative.    Musculoskeletal: Negative.    Skin: Negative.         See history of present illness.   Neurological: Negative.    Hematological: Negative.    Psychiatric/Behavioral: Negative.       Objective:      Physical Exam  Vitals reviewed.   Constitutional:       Appearance: Normal appearance. He is well-developed and normal weight.   HENT:      Head: Normocephalic and atraumatic.      Right Ear: Tympanic membrane normal.      Left Ear: Tympanic membrane normal.      Nose: Nose normal.      Mouth/Throat:      Mouth: Mucous membranes are moist.      Pharynx: No oropharyngeal exudate.   Eyes:      Conjunctiva/sclera: Conjunctivae normal.      Pupils: Pupils are equal, round, and reactive to light.   Neck:      Vascular: No carotid bruit.   Cardiovascular:      Rate and Rhythm: Normal rate and regular rhythm.      Pulses: Normal pulses.      Heart sounds: Normal heart sounds. No murmur heard.    No gallop.   Pulmonary:      Effort: Pulmonary effort is normal.      Breath sounds: Normal breath sounds.   Abdominal:      General: Bowel sounds are normal.      Palpations: Abdomen is soft. There is no mass.      Tenderness: There is no abdominal tenderness.   Musculoskeletal:         General: Normal range of motion.      Cervical back: Normal range of motion.   Skin:     General: Skin is  warm and dry.      Comments: See history of present illness.   Neurological:      General: No focal deficit present.      Mental Status: He is alert and oriented to person, place, and time.   Psychiatric:         Mood and Affect: Mood normal.         Behavior: Behavior normal.       Assessment:       1. Physical exam    2. Neoplasm of neck    3. Hypertension, essential    4. Neoplasm of ear    5. BMI 35.0-35.9,adult    6. Gastroesophageal reflux disease, unspecified whether esophagitis present    7. Insomnia, unspecified type    8. Osteoarthritis of both knees, unspecified osteoarthritis type    9. Smokes with greater than 30 pack year history        Plan:       Physical exam    Neoplasm of neck  -     Ambulatory referral/consult to Dermatology; Future; Expected date: 05/23/2023    Hypertension, essential    Neoplasm of ear  -     Ambulatory referral/consult to Dermatology; Future; Expected date: 05/23/2023    BMI 35.0-35.9,adult    Gastroesophageal reflux disease, unspecified whether esophagitis present    Insomnia, unspecified type    Osteoarthritis of both knees, unspecified osteoarthritis type    Smokes with greater than 30 pack year history  -     CT Chest Lung Screening Low Dose; Future; Expected date: 05/16/2023    Other orders  -     pantoprazole (PROTONIX) 40 MG tablet; Take 1 tablet (40 mg total) by mouth once daily.  Dispense: 90 tablet; Refill: 1  -     lisinopriL (PRINIVIL,ZESTRIL) 40 MG tablet; Take 1 tablet (40 mg total) by mouth once daily.  Dispense: 90 tablet; Refill: 1  -     Pneumococcal Conjugate Vaccine (20 Valent) (IM)    Get his labs from [facility] [facility].  Prevnar 20 today.  CT chest low-dose screening.  Six-month follow-up.    In addition to routine physical.  Some insomnia issue felt loopy on trazodone discontinued it.  Noted a neoplasm on the left ear also on the right posterior neck.      Physical examination.  On the left ear there appears to be a seborrheic keratosis  posteriorly.  On the right posterior neck there is a 3 mm rounded raised Mary Lou like growth.    Impression.  Seborrheic keratosis of the left ear.  Neoplasm of the right neck.  Recommend he see dermatology to have these areas evaluated.  And most likely biopsy of the lesion on the right neck.

## 2023-05-24 ENCOUNTER — HOSPITAL ENCOUNTER (OUTPATIENT)
Dept: RADIOLOGY | Facility: HOSPITAL | Age: 63
Discharge: HOME | End: 2023-05-24
Attending: FAMILY MEDICINE
Payer: OTHER GOVERNMENT

## 2023-05-24 DIAGNOSIS — F17.210 SMOKES WITH GREATER THAN 30 PACK YEAR HISTORY: ICD-10-CM

## 2023-05-24 PROCEDURE — 71271 CT THORAX LUNG CANCER SCR C-: CPT | Mod: TC,PO

## 2023-06-01 ENCOUNTER — PATIENT OUTREACH (OUTPATIENT)
Dept: ADMINISTRATIVE | Facility: HOSPITAL | Age: 63
End: 2023-06-01
Payer: OTHER GOVERNMENT

## 2023-06-01 NOTE — PROGRESS NOTES
Population Health Chart Review & Patient Outreach Details:     Reason for Outreach Encounter:     [x]  Non-Compliant Report   []  Payor Report ([employer], PHN, BCBS, MSSP, MCIP, UHC, etc.)   []  Pre-Visit Chart Review     Updates Requested / Reviewed:     []  Care Everywhere    []     []  External Sources ([facility], [facility], DIS, etc.)   []  Care Team Updated    Patient Outreach Method:    []  Telephone Outreach Completed   [] Successful   [] Left Voicemail   [] Unable to Contact (wrong number, no voicemail)  []  [employer] Portal Outreach Sent  []  Letter Outreach Mailed  []  Fax Sent for External Records  [x]  External Records Upload    Health Maintenance Topics Addressed and Outreach Outcomes / Actions Taken:        []      Breast Cancer Screening []  Mammo Scheduled      []  External Records Requested     []  Added Reminder to Complete to Upcoming Primary Care Appt Notes     []  Patient Declined     []  Patient Will Call Back to Schedule     []  Patient Will Schedule with External Provider / Order Routed if Applicable             []       Cervical Cancer Screening []  Pap Scheduled      []  External Records Requested     []  Added Reminder to Complete to Upcoming Primary Care Appt Notes     []  Patient Declined     []  Patient Will Call Back to Schedule     []  Patient Will Schedule with External Provider               []          Colorectal Cancer Screening []  Colonoscopy Case Request or Referral Placed     []  External Records Requested     []  Added Reminder to Complete to Upcoming Primary Care Appt Notes     []  Patient Declined     []  Patient Will Call Back to Schedule     []  Patient Will Schedule with External Provider     []  Fit Kit Mailed (add the SmartPhrase under additional notes)     []  Reminded Patient to Complete Home Test             []      Diabetic Eye Exam []  Eye Camera Scheduled or Optometry Referral Placed     []  External Records Requested     []  Added Reminder to Complete to  Upcoming Primary Care Appt Notes     []  Patient Declined     []  Patient Will Call Back to Schedule     []  Patient Will Schedule with External Provider             []      Blood Pressure Control []  Primary Care Follow Up Visit Scheduled     []  Remote Blood Pressure Reading Captured     []  Added Reminder to Complete to Upcoming Primary Care Appt Notes     []  Patient Declined     []  Patient Will Call Back / Patient Will Send Portal Message with Reading     []  Patient Will Call Back to Schedule Provider Visit             [x]       HbA1c & Other Labs []  Lab Appt Scheduled for Due Labs     []  Primary Care Follow Up Visit Scheduled      []  Reminded Patient to Complete Home Test     []  Added Reminder to Complete to Upcoming Primary Care Appt Notes     []  Patient Declined     []  Patient Will Call Back to Schedule     []  Patient Will Schedule with External Provider / Order Routed if Applicable           []    Schedule Primary Care Appt []  Primary Care Appt Scheduled     []  Patient Declined     []  Patient Will Call Back to Schedule     []  Pt Established with External Provider & Updated Care Team             []      Medication Adherence []  Primary Care Appointment Scheduled     []  Added Reminder to Upcoming Primary Care Appt Notes     []  Patient Reminded to  Prescription     []  Patient Declined, Provider Notified if Needed     []  Sent Provider Message to Review and/or Add Exclusion to Problem List             []      Osteoporosis Screening []  DXA Appointment Scheduled     []  External Records Requested     []  Added Reminder to Complete to Upcoming Primary Care Appt Notes     []  Patient Declined     []  Patient Will Call Back to Schedule     []  Patient Will Schedule with External Provider / Order Routed if Applicable     Additional Care Coordinator Notes:     PSA updated in chart.    Further Action Needed If Patient Returns Outreach:

## 2023-06-06 ENCOUNTER — TELEPHONE (OUTPATIENT)
Dept: FAMILY MEDICINE | Facility: CLINIC | Age: 63
End: 2023-06-06
Payer: OTHER GOVERNMENT

## 2023-06-06 NOTE — TELEPHONE ENCOUNTER
----- Message from Brian Oviedo III, MD sent at 6/1/2023  9:18 PM CDT -----  NORMAL followup as needed.

## 2023-06-09 ENCOUNTER — PATIENT MESSAGE (OUTPATIENT)
Dept: FAMILY MEDICINE | Facility: CLINIC | Age: 63
End: 2023-06-09
Payer: OTHER GOVERNMENT

## 2023-06-13 ENCOUNTER — PATIENT MESSAGE (OUTPATIENT)
Dept: FAMILY MEDICINE | Facility: CLINIC | Age: 63
End: 2023-06-13
Payer: OTHER GOVERNMENT

## 2023-06-26 ENCOUNTER — PATIENT MESSAGE (OUTPATIENT)
Dept: FAMILY MEDICINE | Facility: CLINIC | Age: 63
End: 2023-06-26
Payer: OTHER GOVERNMENT

## 2023-07-17 ENCOUNTER — TELEPHONE (OUTPATIENT)
Dept: FAMILY MEDICINE | Facility: CLINIC | Age: 63
End: 2023-07-17
Payer: OTHER GOVERNMENT

## 2023-08-23 ENCOUNTER — PATIENT MESSAGE (OUTPATIENT)
Dept: FAMILY MEDICINE | Facility: CLINIC | Age: 63
End: 2023-08-23
Payer: OTHER GOVERNMENT

## 2023-08-24 DIAGNOSIS — Z01.00 ROUTINE EYE EXAM: Primary | ICD-10-CM

## 2023-11-17 ENCOUNTER — OFFICE VISIT (OUTPATIENT)
Dept: FAMILY MEDICINE | Facility: CLINIC | Age: 63
End: 2023-11-17
Payer: OTHER GOVERNMENT

## 2023-11-17 VITALS
BODY MASS INDEX: 34.39 KG/M2 | HEART RATE: 72 BPM | SYSTOLIC BLOOD PRESSURE: 124 MMHG | DIASTOLIC BLOOD PRESSURE: 72 MMHG | OXYGEN SATURATION: 98 % | TEMPERATURE: 98 F | WEIGHT: 240.19 LBS | HEIGHT: 70 IN

## 2023-11-17 DIAGNOSIS — M17.11 OSTEOARTHRITIS OF RIGHT KNEE, UNSPECIFIED OSTEOARTHRITIS TYPE: ICD-10-CM

## 2023-11-17 DIAGNOSIS — K21.9 GASTROESOPHAGEAL REFLUX DISEASE, UNSPECIFIED WHETHER ESOPHAGITIS PRESENT: ICD-10-CM

## 2023-11-17 DIAGNOSIS — G47.00 INSOMNIA, UNSPECIFIED TYPE: ICD-10-CM

## 2023-11-17 DIAGNOSIS — Z13.1 SCREENING FOR DIABETES MELLITUS (DM): ICD-10-CM

## 2023-11-17 DIAGNOSIS — I10 HYPERTENSION, ESSENTIAL: Primary | ICD-10-CM

## 2023-11-17 PROCEDURE — 99214 PR OFFICE/OUTPT VISIT, EST, LEVL IV, 30-39 MIN: ICD-10-PCS | Mod: S$GLB,,, | Performed by: FAMILY MEDICINE

## 2023-11-17 PROCEDURE — 99214 OFFICE O/P EST MOD 30 MIN: CPT | Mod: S$GLB,,, | Performed by: FAMILY MEDICINE

## 2023-11-17 NOTE — PROGRESS NOTES
Subjective:       Patient ID: Isai Mello is a 63 y.o. male.    Chief Complaint: Follow-up    HPI  Patient presents to clinic for follow up. Still working. Generally feeling well. No complaints. Hypertension stable. Cardiovascular ok. Denies chest pain or palpitations. GERD non bothersome. No dysphagia. Weight down 6 lbs. BMI 34. Walking regularly at work. Insomnia. Daytime drowsiness with Trazodone. Discussed other options. Declining at this time. OA. Voltaren gel as needed.  Had side effects from trazodone for insomnia so discontinued it.  Does not want anything else right now.  Walks quite a bit with his work.  Urinating without difficulty.  PSA is 1.79 current was done in March.    Due for Covid booster. Discussed RSV vaccination.   Review of Systems   Respiratory: Negative.     Cardiovascular: Negative.  Negative for chest pain and palpitations.   Psychiatric/Behavioral: Negative.         Objective:      Physical Exam  Constitutional:       Appearance: Normal appearance.   Neck:      Vascular: No carotid bruit.   Cardiovascular:      Rate and Rhythm: Normal rate and regular rhythm.      Pulses: Normal pulses.      Heart sounds: Normal heart sounds.   Pulmonary:      Effort: Pulmonary effort is normal.      Breath sounds: Normal breath sounds.   Musculoskeletal:      Right lower leg: No edema.      Left lower leg: No edema.   Lymphadenopathy:      Cervical: No cervical adenopathy.   Skin:     General: Skin is warm and dry.   Neurological:      General: No focal deficit present.      Mental Status: He is alert and oriented to person, place, and time.   Psychiatric:         Mood and Affect: Mood normal.         Behavior: Behavior normal.         Assessment:       1. Hypertension, essential    2. Gastroesophageal reflux disease, unspecified whether esophagitis present    3. Insomnia, unspecified type    4. Osteoarthritis of right knee, unspecified osteoarthritis type    5. BMI 34.0-34.9,adult    6. Screening for  diabetes mellitus (DM)        Plan:       Hypertension, essential  -     Cancel: Comprehensive Metabolic Panel; Future; Expected date: 11/17/2023  -     Cancel: Lipid Panel; Future; Expected date: 11/17/2023  -     Comprehensive Metabolic Panel; Future; Expected date: 11/17/2023  -     Lipid Panel; Future; Expected date: 11/17/2023    Gastroesophageal reflux disease, unspecified whether esophagitis present  -     Cancel: CBC Auto Differential; Future; Expected date: 11/17/2023  -     CBC Auto Differential; Future; Expected date: 11/17/2023    Insomnia, unspecified type    Osteoarthritis of right knee, unspecified osteoarthritis type    BMI 34.0-34.9,adult    Screening for diabetes mellitus (DM)  -     Cancel: Hemoglobin A1C; Future; Expected date: 11/17/2023  -     Hemoglobin A1C; Future; Expected date: 11/17/2023      Get A1c CBC CMP lipids.  Go for RSV vaccine.  Declines COVID vaccine.  Same medications.  Follow-up here in 6 months.

## 2023-11-22 LAB
ALBUMIN SERPL-MCNC: 4.3 G/DL (ref 3.6–5.1)
ALBUMIN/GLOB SERPL: 1.8 (CALC) (ref 1–2.5)
ALP SERPL-CCNC: 63 U/L (ref 35–144)
ALT SERPL-CCNC: 16 U/L (ref 9–46)
AST SERPL-CCNC: 15 U/L (ref 10–35)
BASOPHILS # BLD AUTO: 30 CELLS/UL (ref 0–200)
BASOPHILS NFR BLD AUTO: 0.5 %
BILIRUB SERPL-MCNC: 0.3 MG/DL (ref 0.2–1.2)
BUN SERPL-MCNC: 15 MG/DL (ref 7–25)
BUN/CREAT SERPL: NORMAL (CALC) (ref 6–22)
CALCIUM SERPL-MCNC: 9.3 MG/DL (ref 8.6–10.3)
CHLORIDE SERPL-SCNC: 106 MMOL/L (ref 98–110)
CHOLEST SERPL-MCNC: 205 MG/DL
CHOLEST/HDLC SERPL: 4.3 (CALC)
CO2 SERPL-SCNC: 28 MMOL/L (ref 20–32)
CREAT SERPL-MCNC: 1.04 MG/DL (ref 0.7–1.35)
EGFR: 81 ML/MIN/1.73M2
EOSINOPHIL # BLD AUTO: 83 CELLS/UL (ref 15–500)
EOSINOPHIL NFR BLD AUTO: 1.4 %
ERYTHROCYTE [DISTWIDTH] IN BLOOD BY AUTOMATED COUNT: 12.9 % (ref 11–15)
GLOBULIN SER CALC-MCNC: 2.4 G/DL (CALC) (ref 1.9–3.7)
GLUCOSE SERPL-MCNC: 93 MG/DL (ref 65–99)
HBA1C MFR BLD: 5.5 % OF TOTAL HGB
HCT VFR BLD AUTO: 40.8 % (ref 38.5–50)
HDLC SERPL-MCNC: 48 MG/DL
HGB BLD-MCNC: 13.7 G/DL (ref 13.2–17.1)
LDLC SERPL CALC-MCNC: 133 MG/DL (CALC)
LYMPHOCYTES # BLD AUTO: 2136 CELLS/UL (ref 850–3900)
LYMPHOCYTES NFR BLD AUTO: 36.2 %
MCH RBC QN AUTO: 30.6 PG (ref 27–33)
MCHC RBC AUTO-ENTMCNC: 33.6 G/DL (ref 32–36)
MCV RBC AUTO: 91.3 FL (ref 80–100)
MONOCYTES # BLD AUTO: 590 CELLS/UL (ref 200–950)
MONOCYTES NFR BLD AUTO: 10 %
NEUTROPHILS # BLD AUTO: 3062 CELLS/UL (ref 1500–7800)
NEUTROPHILS NFR BLD AUTO: 51.9 %
NONHDLC SERPL-MCNC: 157 MG/DL (CALC)
PLATELET # BLD AUTO: 259 THOUSAND/UL (ref 140–400)
PMV BLD REES-ECKER: 10.9 FL (ref 7.5–12.5)
POTASSIUM SERPL-SCNC: 4.9 MMOL/L (ref 3.5–5.3)
PROT SERPL-MCNC: 6.7 G/DL (ref 6.1–8.1)
RBC # BLD AUTO: 4.47 MILLION/UL (ref 4.2–5.8)
SODIUM SERPL-SCNC: 141 MMOL/L (ref 135–146)
TRIGL SERPL-MCNC: 125 MG/DL
WBC # BLD AUTO: 5.9 THOUSAND/UL (ref 3.8–10.8)

## 2024-01-23 ENCOUNTER — PATIENT MESSAGE (OUTPATIENT)
Dept: FAMILY MEDICINE | Facility: CLINIC | Age: 64
End: 2024-01-23
Payer: OTHER GOVERNMENT

## 2024-01-23 RX ORDER — LISINOPRIL 40 MG/1
40 TABLET ORAL DAILY
Qty: 90 TABLET | Refills: 0 | Status: SHIPPED | OUTPATIENT
Start: 2024-01-23 | End: 2024-04-15

## 2024-01-23 RX ORDER — PANTOPRAZOLE SODIUM 40 MG/1
40 TABLET, DELAYED RELEASE ORAL DAILY
Qty: 90 TABLET | Refills: 0 | Status: SHIPPED | OUTPATIENT
Start: 2024-01-23 | End: 2024-02-15

## 2024-02-15 RX ORDER — PANTOPRAZOLE SODIUM 40 MG/1
40 TABLET, DELAYED RELEASE ORAL
Qty: 90 TABLET | Refills: 3 | Status: SHIPPED | OUTPATIENT
Start: 2024-02-15

## 2024-02-15 NOTE — TELEPHONE ENCOUNTER
No care due was identified.  Health [facility] Embedded Care Due Messages. Reference number: [id number].   2/15/2024 5:40:40 AM CST

## 2024-02-16 NOTE — TELEPHONE ENCOUNTER
Refill Decision Note   Isai Mello  is requesting a refill authorization.  Brief Assessment and Rationale for Refill:  Approve     Medication Therapy Plan:         Comments:     Note composed:9:28 PM 02/15/2024

## 2024-02-22 ENCOUNTER — OFFICE VISIT (OUTPATIENT)
Dept: FAMILY MEDICINE | Facility: CLINIC | Age: 64
End: 2024-02-22
Payer: OTHER GOVERNMENT

## 2024-02-22 ENCOUNTER — PATIENT MESSAGE (OUTPATIENT)
Dept: FAMILY MEDICINE | Facility: CLINIC | Age: 64
End: 2024-02-22
Payer: OTHER GOVERNMENT

## 2024-02-22 VITALS
HEART RATE: 71 BPM | OXYGEN SATURATION: 98 % | SYSTOLIC BLOOD PRESSURE: 140 MMHG | DIASTOLIC BLOOD PRESSURE: 76 MMHG | WEIGHT: 238.75 LBS | HEIGHT: 70 IN | TEMPERATURE: 98 F | BODY MASS INDEX: 34.18 KG/M2

## 2024-02-22 DIAGNOSIS — J06.9 VIRAL URI WITH COUGH: Primary | ICD-10-CM

## 2024-02-22 PROCEDURE — 99213 OFFICE O/P EST LOW 20 MIN: CPT | Mod: S$PBB,,, | Performed by: FAMILY MEDICINE

## 2024-02-22 PROCEDURE — 99214 OFFICE O/P EST MOD 30 MIN: CPT | Mod: PBBFAC,PO | Performed by: FAMILY MEDICINE

## 2024-02-22 PROCEDURE — 99999 PR PBB SHADOW E&M-EST. PATIENT-LVL IV: CPT | Mod: PBBFAC,,, | Performed by: FAMILY MEDICINE

## 2024-02-22 RX ORDER — BENZONATATE 200 MG/1
200 CAPSULE ORAL 3 TIMES DAILY PRN
Qty: 60 CAPSULE | Refills: 1 | Status: SHIPPED | OUTPATIENT
Start: 2024-02-22 | End: 2024-05-10

## 2024-02-22 NOTE — PATIENT INSTRUCTIONS
Push fluids intake.  Drink plenty of water.     Vitamin C 1,000 mg three times a day     Contact me or your PCP if any worsening or for any new concerns as we discussed.

## 2024-02-22 NOTE — PROGRESS NOTES
Subjective:       Patient ID: Isai Mello is a 63 y.o. male.    Chief Complaint: No chief complaint on file.    New to me patient here for UC visit.  Day 6 of cough.  URI/Cold symptoms at onset.  Last night he felt pain in left ant and right posterior chest with coughing spells.  None toady and no pleuritic pain.  No fever or SOB or post-tussive HA.  Saw a little brown sputum at one point but none x few days.  Using Mucinex Multi Sx and Nyquil and Flonase.      Review of Systems   Constitutional:  Negative for fever.   Respiratory:  Negative for shortness of breath.    Cardiovascular:  Negative for chest pain.   Gastrointestinal:  Negative for abdominal pain and nausea.   Skin:  Negative for rash.   Neurological:  Negative for numbness.   All other systems reviewed and are negative.      Objective:      Physical Exam  Vitals reviewed.   Constitutional:       General: He is not in acute distress.     Appearance: He is well-developed.   HENT:      Right Ear: Tympanic membrane normal.      Left Ear: Tympanic membrane normal.      Nose: Mucosal edema present.      Mouth/Throat:      Pharynx: Posterior oropharyngeal erythema present.   Cardiovascular:      Rate and Rhythm: Normal rate and regular rhythm.      Heart sounds: No murmur heard.  Pulmonary:      Effort: Pulmonary effort is normal.      Breath sounds: Normal breath sounds. No wheezing or rales.   Musculoskeletal:      Cervical back: Neck supple.   Lymphadenopathy:      Cervical: No cervical adenopathy.   Skin:     General: Skin is warm and dry.   Neurological:      Mental Status: He is alert.         Assessment:       1. Viral URI with cough        Plan:       Viral URI with cough  -     benzonatate (TESSALON) 200 MG capsule; Take 1 capsule (200 mg total) by mouth 3 (three) times daily as needed for Cough.  Dispense: 60 capsule; Refill: 1      Patient Instructions   Push fluids intake.  Drink plenty of water.     Vitamin C 1,000 mg three times a day      Contact me or your PCP if any worsening or for any new concerns as we discussed.

## 2024-03-20 ENCOUNTER — PATIENT MESSAGE (OUTPATIENT)
Dept: ADMINISTRATIVE | Facility: OTHER | Age: 64
End: 2024-03-20
Payer: OTHER GOVERNMENT

## 2024-04-15 RX ORDER — LISINOPRIL 40 MG/1
40 TABLET ORAL
Qty: 90 TABLET | Refills: 2 | Status: SHIPPED | OUTPATIENT
Start: 2024-04-15

## 2024-04-15 NOTE — TELEPHONE ENCOUNTER
Refill Routing Note   Medication(s) are not appropriate for processing by [facility] for the following reason(s):        Required vitals abnormal    [facility] action(s):  Defer               Appointments  past 12m or future 3m with PCP    Date Provider   Last Visit   11/17/2023 Brian Oviedo III, MD   Next Visit   5/10/2024 Brian Oviedo III, MD   ED visits in past 90 days: 0        Note composed:1:35 PM 04/15/2024

## 2024-04-15 NOTE — TELEPHONE ENCOUNTER
No care due was identified.  Health [facility] Embedded Care Due Messages. Reference number: [id number].   4/15/2024 5:20:17 AM CDT

## 2024-05-10 ENCOUNTER — OFFICE VISIT (OUTPATIENT)
Dept: FAMILY MEDICINE | Facility: CLINIC | Age: 64
End: 2024-05-10
Payer: OTHER GOVERNMENT

## 2024-05-10 ENCOUNTER — PATIENT MESSAGE (OUTPATIENT)
Dept: FAMILY MEDICINE | Facility: CLINIC | Age: 64
End: 2024-05-10

## 2024-05-10 VITALS
DIASTOLIC BLOOD PRESSURE: 78 MMHG | OXYGEN SATURATION: 97 % | RESPIRATION RATE: 18 BRPM | TEMPERATURE: 98 F | HEIGHT: 70 IN | SYSTOLIC BLOOD PRESSURE: 126 MMHG | HEART RATE: 63 BPM | WEIGHT: 240.31 LBS | BODY MASS INDEX: 34.4 KG/M2

## 2024-05-10 DIAGNOSIS — M17.9 OSTEOARTHRITIS OF KNEE, UNSPECIFIED LATERALITY, UNSPECIFIED OSTEOARTHRITIS TYPE: ICD-10-CM

## 2024-05-10 DIAGNOSIS — I10 HYPERTENSION, ESSENTIAL: Primary | ICD-10-CM

## 2024-05-10 DIAGNOSIS — Z12.5 SCREENING PSA (PROSTATE SPECIFIC ANTIGEN): ICD-10-CM

## 2024-05-10 DIAGNOSIS — Z12.11 COLON CANCER SCREENING: ICD-10-CM

## 2024-05-10 DIAGNOSIS — K21.9 GASTROESOPHAGEAL REFLUX DISEASE, UNSPECIFIED WHETHER ESOPHAGITIS PRESENT: ICD-10-CM

## 2024-05-10 PROCEDURE — 99999 PR PBB SHADOW E&M-EST. PATIENT-LVL V: CPT | Mod: PBBFAC,,, | Performed by: FAMILY MEDICINE

## 2024-05-10 PROCEDURE — 99215 OFFICE O/P EST HI 40 MIN: CPT | Mod: PBBFAC,PN | Performed by: FAMILY MEDICINE

## 2024-05-10 PROCEDURE — 99214 OFFICE O/P EST MOD 30 MIN: CPT | Mod: S$PBB,,, | Performed by: FAMILY MEDICINE

## 2024-05-10 NOTE — PATIENT INSTRUCTIONS
Colonoscopy consult  Parker Roper MD  [id number] [id number]  Phone: [phone]  Fax: [phone]

## 2024-05-12 NOTE — PROGRESS NOTES
Subjective:       Patient ID: Isai Mello is a 63 y.o. male.    Chief Complaint: Follow-up (6 month)    Follow-up of hypertension blood pressure under good control.  [facility]  His LDL was 133.  Prostate screening urinating without difficulty.  PSA due.  Gastroesophageal reflux disease doing okay with Protonix.  Some reflux with greasy food however.  BMI of 34.5.  Colon cancer screening scope done in September of 2014 by Dr. Gareth soriano repeat in 10 years so coming up soon.  Degenerative joint disease of the knee using Voltaren gel does help some.      Physical examination.  Vital signs noted.  No acute distress neck without bruit.  Chest clear.  Heart regular rate rhythm.  Abdomen bowel sounds are positive soft nontender.  Extremities without edema positive pedal pulses.        Objective:        Assessment:       1. Hypertension, essential    2. Gastroesophageal reflux disease, unspecified whether esophagitis present    3. BMI 34.0-34.9,adult    4. Screening PSA (prostate specific antigen)    5. Colon cancer screening    6. Osteoarthritis of knee, unspecified laterality, unspecified osteoarthritis type        Plan:       Hypertension, essential    Gastroesophageal reflux disease, unspecified whether esophagitis present    BMI 34.0-34.9,adult    Screening PSA (prostate specific antigen)  -     Cancel: PSA, Screening; Future; Expected date: 05/10/2024  -     PSA, Screening; Future; Expected date: 05/10/2024    Colon cancer screening  -     Cancel: Ambulatory referral/consult to Gastroenterology; Future; Expected date: 05/17/2024  -     Ambulatory referral/consult to Gastroenterology; Future; Expected date: 05/17/2024    Osteoarthritis of knee, unspecified laterality, unspecified osteoarthritis type      PSA.  Colonoscopy in September by Dr. Gareth soriano.  Labs from [facility].  Continue current medications refills as needed.  Follow-up in 6 months.  All care gaps addressed.

## 2024-05-13 DIAGNOSIS — F17.210 SMOKING GREATER THAN 30 PACK YEARS: Primary | ICD-10-CM

## 2024-05-24 ENCOUNTER — HOSPITAL ENCOUNTER (OUTPATIENT)
Dept: RADIOLOGY | Facility: HOSPITAL | Age: 64
Discharge: HOME | End: 2024-05-24
Attending: FAMILY MEDICINE
Payer: OTHER GOVERNMENT

## 2024-05-24 DIAGNOSIS — F17.210 SMOKING GREATER THAN 30 PACK YEARS: ICD-10-CM

## 2024-05-24 PROCEDURE — 71271 CT THORAX LUNG CANCER SCR C-: CPT | Mod: TC,PO

## 2024-05-24 PROCEDURE — 71271 CT THORAX LUNG CANCER SCR C-: CPT | Mod: 26,,, | Performed by: RADIOLOGY

## 2024-06-03 ENCOUNTER — PATIENT OUTREACH (OUTPATIENT)
Dept: ADMINISTRATIVE | Facility: HOSPITAL | Age: 64
End: 2024-06-03
Payer: OTHER GOVERNMENT

## 2024-06-03 NOTE — PROGRESS NOTES
Population Health Chart Review & Patient Outreach Details      Additional [facility] Notes:    LAST COLONOSCOPY WITH DR AWLDROP 9/15/2014 AS OF 5/3/24           Updates Requested / Reviewed:      Updated Care Coordination Note and          Health Maintenance Topics Overdue:      VBHM Score: 0     Patient is not due for any topics at this time.                       Health Maintenance Topic(s) Outreach Outcomes & Actions Taken:

## 2024-06-18 ENCOUNTER — PATIENT OUTREACH (OUTPATIENT)
Dept: ADMINISTRATIVE | Facility: HOSPITAL | Age: 64
End: 2024-06-18
Payer: OTHER GOVERNMENT

## 2024-06-18 NOTE — PROGRESS NOTES
Population Health Chart Review & Patient Outreach Details      Additional [facility] Health Notes:               Updates Requested / Reviewed:      Updated Care Coordination Note, Care Everywhere, and          Health Maintenance Topics Overdue:      VBHM Score: 0     Patient is not due for any topics at this time.                       Health Maintenance Topic(s) Outreach Outcomes & Actions Taken:    Colorectal Cancer Screening - Outreach Outcomes & Actions Taken  : Colonoscopy Case Request / Referral / Home Test Order Placed

## 2024-06-20 ENCOUNTER — TELEPHONE (OUTPATIENT)
Dept: ADMINISTRATIVE | Facility: HOSPITAL | Age: 64
End: 2024-06-20
Payer: OTHER GOVERNMENT

## 2024-07-03 ENCOUNTER — PATIENT MESSAGE (OUTPATIENT)
Dept: FAMILY MEDICINE | Facility: CLINIC | Age: 64
End: 2024-07-03
Payer: OTHER GOVERNMENT

## 2024-07-03 DIAGNOSIS — Z12.11 COLON CANCER SCREENING: Primary | ICD-10-CM

## 2024-08-06 ENCOUNTER — PATIENT MESSAGE (OUTPATIENT)
Dept: FAMILY MEDICINE | Facility: CLINIC | Age: 64
End: 2024-08-06
Payer: OTHER GOVERNMENT

## 2024-09-04 ENCOUNTER — PATIENT MESSAGE (OUTPATIENT)
Dept: FAMILY MEDICINE | Facility: CLINIC | Age: 64
End: 2024-09-04
Payer: OTHER GOVERNMENT

## 2024-09-16 LAB — CRC RECOMMENDATION EXT: NORMAL

## 2024-09-17 DIAGNOSIS — Z12.83 SCREENING FOR SKIN CANCER: ICD-10-CM

## 2024-09-17 DIAGNOSIS — D49.89 NEOPLASM OF NECK: Primary | ICD-10-CM

## 2024-09-17 DIAGNOSIS — D49.2 NEOPLASM OF EAR: ICD-10-CM

## 2024-09-18 ENCOUNTER — PATIENT OUTREACH (OUTPATIENT)
Dept: ADMINISTRATIVE | Facility: HOSPITAL | Age: 64
End: 2024-09-18
Payer: OTHER GOVERNMENT

## 2024-09-18 NOTE — PROGRESS NOTES
Population Health Chart Review & Patient Outreach Details      Additional [facility] Health Notes:               Updates Requested / Reviewed:      Updated Care Coordination Note, Care Everywhere, and External Sources: Provation, Immunizations         Health Maintenance Topics Overdue:      VBHM Score: 0     Patient is not due for any topics at this time.    Influenza Vaccine                  Health Maintenance Topic(s) Outreach Outcomes & Actions Taken:    Colorectal Cancer Screening - Outreach Outcomes & Actions Taken  : External Records Uploaded, Care Team Updated, & History Updated if Applicable

## 2024-11-08 ENCOUNTER — OFFICE VISIT (OUTPATIENT)
Dept: FAMILY MEDICINE | Facility: CLINIC | Age: 64
End: 2024-11-08
Payer: OTHER GOVERNMENT

## 2024-11-08 VITALS
HEART RATE: 53 BPM | DIASTOLIC BLOOD PRESSURE: 80 MMHG | BODY MASS INDEX: 34.39 KG/M2 | WEIGHT: 240.19 LBS | TEMPERATURE: 98 F | OXYGEN SATURATION: 95 % | HEIGHT: 70 IN | SYSTOLIC BLOOD PRESSURE: 138 MMHG

## 2024-11-08 DIAGNOSIS — F17.210 SMOKING GREATER THAN 30 PACK YEARS: ICD-10-CM

## 2024-11-08 DIAGNOSIS — G47.00 INSOMNIA, UNSPECIFIED TYPE: ICD-10-CM

## 2024-11-08 DIAGNOSIS — Z12.5 SCREENING FOR PROSTATE CANCER: ICD-10-CM

## 2024-11-08 DIAGNOSIS — I10 HYPERTENSION, ESSENTIAL: Primary | ICD-10-CM

## 2024-11-08 PROCEDURE — 99999 PR PBB SHADOW E&M-EST. PATIENT-LVL IV: CPT | Mod: PBBFAC,,, | Performed by: FAMILY MEDICINE

## 2024-11-08 PROCEDURE — 99214 OFFICE O/P EST MOD 30 MIN: CPT | Mod: S$PBB,,, | Performed by: FAMILY MEDICINE

## 2024-11-08 PROCEDURE — 99214 OFFICE O/P EST MOD 30 MIN: CPT | Mod: PBBFAC,PN | Performed by: FAMILY MEDICINE

## 2024-11-08 RX ORDER — UBIDECARENONE 30 MG
30 CAPSULE ORAL DAILY
COMMUNITY

## 2024-11-08 NOTE — PROGRESS NOTES
SCRIBE #1 NOTE: I, Moshe Rockwell, am scribing for, and in the presence of, Brian Oviedo III, MD. I have scribed the entire note.     Subjective:       Patient ID: Isai Mello is a 64 y.o. male.    Chief Complaint: Follow-up (6 month)    Isai Mello is a 64 y.o. male who presents for 6 month follow-up. Mr. Mello was last seen  5/10/24. Hearing loss. 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 Smoker with greater than 30 pack per year history,1/2 PPD. Pulmonary CT scan up to date. Hypertension is controlled. BP is 138/80. He donates blood every 6 weeks. The patient only knows what his BP is when it is checked upon donation and these numbers are logged. Systolic ranges 126-144. Does not take any iron supplements-- only multivitamins, CoQ10, and fish oil. Home blood sugar was elevated this morning. He has had 2 cups of coffee before arriving at the office. Cardiovascular good. No chest pain. No palpitations. BMI is 34.36. Insomnia. Not on any medication, the previous made him very drowsy. Some days the insomnia is doing great, some days its bad. Influenza vaccination up to date, injected 9/19/24. HIV screening due. Prostate screening no dysuria frequency urgency or hematuria. PSA is due, last was in 2/23. Colonoscopy conducted recently by Dr. Roper, repeat in 10 years.           Review of Systems   Constitutional:  Negative for chills and fever.   HENT:  Negative for congestion and sore throat.    Eyes:  Negative for pain and visual disturbance.   Respiratory:  Negative for chest tightness and shortness of breath.    Cardiovascular:  Negative for chest pain and palpitations.   Gastrointestinal:  Negative for nausea.   Endocrine: Negative for polydipsia and polyuria.   Genitourinary:  Negative for dysuria, flank pain, frequency, hematuria and urgency.   Musculoskeletal:  Negative for back pain, neck pain and neck stiffness.   Skin:  Negative for rash.    Allergic/Immunologic: Negative for immunocompromised state.   Neurological:  Negative for dizziness and weakness.   Hematological:  Does not bruise/bleed easily.   Psychiatric/Behavioral:  Negative for agitation and behavioral problems.    All other systems reviewed and are negative.      Objective:      Physical examination: Vital signs noted. No acute distress. No carotid bruit. Regular heart rate and rhythm. Lungs clear to auscultation bilaterally. Abdomen bowel sounds are positive soft and nontender. Extremities without edema. 2+ pedal pulses.      Assessment:       1. Hypertension, essential    2. BMI 34.0-34.9,adult    3. Insomnia, unspecified type    4. Screening for prostate cancer    5. Smoking greater than 30 pack years        Plan:       Hypertension, essential  -     CBC Auto Differential; Future; Expected date: 11/08/2024  -     Comprehensive Metabolic Panel; Future; Expected date: 11/08/2024  -     Lipid Panel; Future; Expected date: 11/08/2024  -     Ferritin; Future; Expected date: 11/08/2024    BMI 34.0-34.9,adult    Insomnia, unspecified type    Screening for prostate cancer  -     PSA, Screening; Future; Expected date: 11/08/2024    Smoking greater than 30 pack years    Hypertension is under good control.  Discontinue sleeping medicines due to side effects.  Check PSA CBC CMP lipids and ferritin.  Donating blood regularly.  Flu shot documentation from [facility] done 09/1924.  Six-month follow-up here.    I, Dr Brian Oviedo, personally performed the services described in this documentation. All medical record entries made by the scribe were at my direction and in my presence. I have reviewed the chart and agree that the record reflects my personal performance and is accurate and complete.

## 2024-11-10 PROBLEM — F17.210 SMOKING GREATER THAN 30 PACK YEARS: Status: ACTIVE | Noted: 2024-11-10

## 2024-11-16 LAB
ALBUMIN SERPL-MCNC: 4.1 G/DL (ref 3.6–5.1)
ALBUMIN/GLOB SERPL: 1.6 (CALC) (ref 1–2.5)
ALP SERPL-CCNC: 66 U/L (ref 35–144)
ALT SERPL-CCNC: 14 U/L (ref 9–46)
AST SERPL-CCNC: 14 U/L (ref 10–35)
BASOPHILS # BLD AUTO: 21 CELLS/UL (ref 0–200)
BASOPHILS NFR BLD AUTO: 0.4 %
BILIRUB SERPL-MCNC: 0.3 MG/DL (ref 0.2–1.2)
BUN SERPL-MCNC: 12 MG/DL (ref 7–25)
BUN/CREAT SERPL: ABNORMAL (CALC) (ref 6–22)
CALCIUM SERPL-MCNC: 9 MG/DL (ref 8.6–10.3)
CHLORIDE SERPL-SCNC: 107 MMOL/L (ref 98–110)
CHOLEST SERPL-MCNC: 205 MG/DL
CHOLEST/HDLC SERPL: 5.4 (CALC)
CO2 SERPL-SCNC: 27 MMOL/L (ref 20–32)
CREAT SERPL-MCNC: 1 MG/DL (ref 0.7–1.35)
EGFR: 85 ML/MIN/1.73M2
EOSINOPHIL # BLD AUTO: 122 CELLS/UL (ref 15–500)
EOSINOPHIL NFR BLD AUTO: 2.3 %
ERYTHROCYTE [DISTWIDTH] IN BLOOD BY AUTOMATED COUNT: 12.8 % (ref 11–15)
FERRITIN SERPL-MCNC: 10 NG/ML (ref 24–380)
GLOBULIN SER CALC-MCNC: 2.5 G/DL (CALC) (ref 1.9–3.7)
GLUCOSE SERPL-MCNC: 104 MG/DL (ref 65–99)
HCT VFR BLD AUTO: 42.1 % (ref 38.5–50)
HDLC SERPL-MCNC: 38 MG/DL
HGB BLD-MCNC: 14.1 G/DL (ref 13.2–17.1)
LDLC SERPL CALC-MCNC: 144 MG/DL (CALC)
LYMPHOCYTES # BLD AUTO: 2104 CELLS/UL (ref 850–3900)
LYMPHOCYTES NFR BLD AUTO: 39.7 %
MCH RBC QN AUTO: 31.1 PG (ref 27–33)
MCHC RBC AUTO-ENTMCNC: 33.5 G/DL (ref 32–36)
MCV RBC AUTO: 92.7 FL (ref 80–100)
MONOCYTES # BLD AUTO: 477 CELLS/UL (ref 200–950)
MONOCYTES NFR BLD AUTO: 9 %
NEUTROPHILS # BLD AUTO: 2576 CELLS/UL (ref 1500–7800)
NEUTROPHILS NFR BLD AUTO: 48.6 %
NONHDLC SERPL-MCNC: 167 MG/DL (CALC)
PLATELET # BLD AUTO: 267 THOUSAND/UL (ref 140–400)
PMV BLD REES-ECKER: 10.9 FL (ref 7.5–12.5)
POTASSIUM SERPL-SCNC: 4.9 MMOL/L (ref 3.5–5.3)
PROT SERPL-MCNC: 6.6 G/DL (ref 6.1–8.1)
PSA SERPL-MCNC: 1.34 NG/ML
RBC # BLD AUTO: 4.54 MILLION/UL (ref 4.2–5.8)
SODIUM SERPL-SCNC: 139 MMOL/L (ref 135–146)
TRIGL SERPL-MCNC: 115 MG/DL
WBC # BLD AUTO: 5.3 THOUSAND/UL (ref 3.8–10.8)

## 2024-11-21 ENCOUNTER — TELEPHONE (OUTPATIENT)
Dept: FAMILY MEDICINE | Facility: CLINIC | Age: 64
End: 2024-11-21
Payer: OTHER GOVERNMENT

## 2024-11-21 DIAGNOSIS — E61.1 IRON DEFICIENCY: Primary | ICD-10-CM

## 2024-11-21 NOTE — TELEPHONE ENCOUNTER
----- Message from Brian Oviedo MD sent at 11/16/2024  8:32 PM CST -----  Iron deficiency due to blood donation.  Needs to take iron daily.  Cholesterol is trending little higher.  Low-fat diet.  CBC ferritin and lipids in 3 months.  FOLLOW-UP AS RECOMMENDED

## 2025-01-02 ENCOUNTER — PATIENT MESSAGE (OUTPATIENT)
Dept: FAMILY MEDICINE | Facility: CLINIC | Age: 65
End: 2025-01-02

## 2025-01-20 ENCOUNTER — PATIENT MESSAGE (OUTPATIENT)
Dept: FAMILY MEDICINE | Facility: CLINIC | Age: 65
End: 2025-01-20

## 2025-01-21 NOTE — TELEPHONE ENCOUNTER
No care due was identified.  Health [facility] Embedded Care Due Messages. Reference number: [id number].   1/21/2025 10:37:23 AM CST

## 2025-01-22 RX ORDER — LISINOPRIL 40 MG/1
40 TABLET ORAL DAILY
Qty: 90 TABLET | Refills: 0 | Status: SHIPPED | OUTPATIENT
Start: 2025-01-22

## 2025-01-22 RX ORDER — PANTOPRAZOLE SODIUM 40 MG/1
40 TABLET, DELAYED RELEASE ORAL DAILY
Qty: 90 TABLET | Refills: 0 | Status: SHIPPED | OUTPATIENT
Start: 2025-01-22

## 2025-02-24 ENCOUNTER — PATIENT MESSAGE (OUTPATIENT)
Dept: FAMILY MEDICINE | Facility: CLINIC | Age: 65
End: 2025-02-24

## 2025-02-24 LAB
ALBUMIN: 4.2
ALP ISOS SERPL LEV INH-CCNC: 59 U/L
ALT SERPL-CCNC: 20 U/L
AST: 20
BILIRUB SERPL-MCNC: 0.5 MG/DL (ref 0.1–1.4)
BUN BLD-MCNC: 14 MG/DL (ref 4–21)
CALCIUM SERPL-MCNC: 9.3 MG/DL
CHLORIDE SERPL-SCNC: 104 MMOL/L (ref 99–108)
CHOLEST SERPL-MSCNC: 241 MG/DL (ref 0–200)
CO2 SERPL-SCNC: 27 MMOL/L
CREAT SERPL-MCNC: 0.9 MG/DL
EGFR: 90
GLUCOSE SERPL-MCNC: 95 MG/DL
HDLC SERPL-MCNC: 45 MG/DL (ref 35–70)
LDL CHOLESTEROL DIRECT: 161 MG/DL
LDLC SERPL CALC-MCNC: 170 MG/DL
POTASSIUM SERPL-SCNC: 4.6 MMOL/L
PSA SERPL-MCNC: 2.37 NG/ML
SODIUM BLD-SCNC: 140 MMOL/L (ref 137–147)
TOTAL PROTEIN: 7.1 G/DL (ref 6.4–8.2)
TRIGL SERPL-MCNC: 126 MG/DL (ref 40–160)

## 2025-03-12 ENCOUNTER — PATIENT MESSAGE (OUTPATIENT)
Dept: FAMILY MEDICINE | Facility: CLINIC | Age: 65
End: 2025-03-12

## 2025-03-27 ENCOUNTER — PATIENT OUTREACH (OUTPATIENT)
Dept: ADMINISTRATIVE | Facility: HOSPITAL | Age: 65
End: 2025-03-27

## 2025-04-28 ENCOUNTER — TELEPHONE (OUTPATIENT)
Dept: FAMILY MEDICINE | Facility: CLINIC | Age: 65
End: 2025-04-28
Payer: OTHER GOVERNMENT

## 2025-04-28 NOTE — TELEPHONE ENCOUNTER
----- Message from Parry sent at 4/28/2025 12:20 PM CDT -----  Contact: pt  Type:  Needs Medical AdviceWho Called: ptWould the patient rather a call back or a response via [facility]? Call back Best Call Back Number: [phone] Information: pt missed a call and is unsure if it was from this office or not. Pt would like someone to call him back

## 2025-05-01 ENCOUNTER — OFFICE VISIT (OUTPATIENT)
Dept: FAMILY MEDICINE | Facility: CLINIC | Age: 65
End: 2025-05-01
Payer: OTHER GOVERNMENT

## 2025-05-01 VITALS
DIASTOLIC BLOOD PRESSURE: 82 MMHG | WEIGHT: 224.13 LBS | TEMPERATURE: 98 F | SYSTOLIC BLOOD PRESSURE: 170 MMHG | HEIGHT: 70 IN | HEART RATE: 69 BPM | BODY MASS INDEX: 32.09 KG/M2 | OXYGEN SATURATION: 96 %

## 2025-05-01 DIAGNOSIS — I10 HYPERTENSION, ESSENTIAL: Primary | ICD-10-CM

## 2025-05-01 DIAGNOSIS — F17.210 SMOKING GREATER THAN 30 PACK YEARS: ICD-10-CM

## 2025-05-01 DIAGNOSIS — E78.5 HYPERLIPIDEMIA, UNSPECIFIED HYPERLIPIDEMIA TYPE: ICD-10-CM

## 2025-05-01 DIAGNOSIS — F41.9 ANXIETY: ICD-10-CM

## 2025-05-01 DIAGNOSIS — L57.0 AK (ACTINIC KERATOSIS): ICD-10-CM

## 2025-05-01 DIAGNOSIS — M17.9 OSTEOARTHRITIS OF KNEE, UNSPECIFIED LATERALITY, UNSPECIFIED OSTEOARTHRITIS TYPE: ICD-10-CM

## 2025-05-01 DIAGNOSIS — N52.9 ERECTILE DYSFUNCTION, UNSPECIFIED ERECTILE DYSFUNCTION TYPE: ICD-10-CM

## 2025-05-01 DIAGNOSIS — G47.00 INSOMNIA, UNSPECIFIED TYPE: ICD-10-CM

## 2025-05-01 PROCEDURE — 99999 PR PBB SHADOW E&M-EST. PATIENT-LVL V: CPT | Mod: PBBFAC,,, | Performed by: FAMILY MEDICINE

## 2025-05-01 PROCEDURE — 99215 OFFICE O/P EST HI 40 MIN: CPT | Mod: PBBFAC,PN | Performed by: FAMILY MEDICINE

## 2025-05-01 PROCEDURE — 99214 OFFICE O/P EST MOD 30 MIN: CPT | Mod: S$PBB,,, | Performed by: FAMILY MEDICINE

## 2025-05-01 RX ORDER — SILDENAFIL 100 MG/1
100 TABLET, FILM COATED ORAL DAILY PRN
COMMUNITY
End: 2025-05-01 | Stop reason: SDUPTHER

## 2025-05-01 RX ORDER — AMLODIPINE BESYLATE 5 MG/1
5 TABLET ORAL DAILY
Qty: 30 TABLET | Refills: 2 | Status: SHIPPED | OUTPATIENT
Start: 2025-05-01

## 2025-05-01 RX ORDER — AMLODIPINE BESYLATE 5 MG/1
5 TABLET ORAL DAILY
COMMUNITY
End: 2025-05-01 | Stop reason: SDUPTHER

## 2025-05-01 RX ORDER — SILDENAFIL 100 MG/1
100 TABLET, FILM COATED ORAL DAILY PRN
Qty: 30 TABLET | Refills: 5 | Status: SHIPPED | OUTPATIENT
Start: 2025-05-01

## 2025-05-01 NOTE — PROGRESS NOTES
SCRIBE #1 NOTE: I, Justo Santana, am scribing for, and in the presence of,  Brian Oviedo III, MD. I have scribed the entire note.     Subjective:       Patient ID: Isai Mello is a 64 y.o. male.    Chief Complaint: Follow-up    Mr. Mello is here for a follow up with his last appointment being here on November 8, 2024. DJD knee. Reports his knee is bearable today. He is using the cream for his knee. He [facility] if he has to. Smoking greater than 30 pack years. CT scan of lungs is due in June 2025. BMI of 32.15, down 16 pounds. He was 240 pounds and is now 224 pounds. Cardiovascular good. No chest pain. No palpitations. Blood pressure is 170/82. Hypertension controlled. On Lisinopril 40mg. His pressure has been fluctuating based off anxiety and knee pain. States it is usually below 140. He last checked it a few days ago. Hyperlipidemia. Cholesterol is 241. HDL is 45. LDL is 170. [facility]. Anxiety. Insomnia. AK on skin. Saw Dr. Weil for dermatology before. He needs a referral. ED. He inquired about some medication to help with this. PSA is up to date.     Review of Systems   Constitutional:  Negative for chills and fever.   HENT:  Negative for congestion and sore throat.    Eyes:  Negative for pain and visual disturbance.   Respiratory:  Negative for chest tightness and shortness of breath.    Cardiovascular:  Negative for chest pain.   Gastrointestinal:  Negative for nausea.   Endocrine: Negative for polydipsia and polyuria.   Genitourinary:  Negative for dysuria and flank pain.   Musculoskeletal:  Negative for back pain, neck pain and neck stiffness.   Skin:  Negative for rash.   Allergic/Immunologic: Negative for immunocompromised state.   Neurological:  Negative for dizziness and weakness.   Hematological:  Does not bruise/bleed easily.   Psychiatric/Behavioral:  Negative for agitation and behavioral problems.    All other systems reviewed  and are negative.      Objective:      Physical examination: Vital signs noted. Upon reexamination, blood pressure is 170/82. No acute distress. No carotid bruit. Regular heart rate and rhythm. Lungs clear to auscultation bilaterally. Abdomen bowel sounds positive soft and nontender. Extremities without edema. 2+ pedal pulses.      Assessment:       1. Hypertension, essential    2. Insomnia, unspecified type    3. Anxiety    4. Osteoarthritis of knee, unspecified laterality, unspecified osteoarthritis type    5. Smoking greater than 30 pack years    6. Hyperlipidemia, unspecified hyperlipidemia type    7. BMI 32.0-32.9,adult    8. Erectile dysfunction, unspecified erectile dysfunction type    9. AK (actinic keratosis)        Plan:       Hypertension, essential    Insomnia, unspecified type    Anxiety    Osteoarthritis of knee, unspecified laterality, unspecified osteoarthritis type    Smoking greater than 30 pack years    Hyperlipidemia, unspecified hyperlipidemia type  -     Lipid Panel; Future; Expected date: 05/01/2025    BMI 32.0-32.9,adult    Erectile dysfunction, unspecified erectile dysfunction type    AK (actinic keratosis)  -     Ambulatory referral/consult to Dermatology; Future; Expected date: 05/08/2025    CT chest to be done after June 24th.  Refer to Dr. Weil..  Fasting lipids.  Add Norvasc 5 mg daily 30 with 2 refills.  Blood pressure follow-up with nurse practitioner in a few weeks.  Viagra 100 mg 1/2 or 1 daily maximum PRN 30 with 5 refills.  All care gaps addressed or discussed.     I, Brian Oviedo III, MD, personally performed the services described in this documentation. All medical record entries made by the scribe were at my direction and in my presence. I have reviewed the chart and agree that the record reflects my personal performance and is accurate and complete.

## 2025-05-10 ENCOUNTER — RESULTS FOLLOW-UP (OUTPATIENT)
Dept: FAMILY MEDICINE | Facility: CLINIC | Age: 65
End: 2025-05-10

## 2025-05-11 NOTE — PROGRESS NOTES
Cholesterol is improving but still elevated.  I recommend a heart healthy diet rich in fiber, fresh vegetables and fruit and low in saturated fats (fried foods, red meat, etc.).  I also recommend regular exercise.

## 2025-05-26 ENCOUNTER — OFFICE VISIT (OUTPATIENT)
Dept: FAMILY MEDICINE | Facility: CLINIC | Age: 65
End: 2025-05-26
Payer: OTHER GOVERNMENT

## 2025-05-26 VITALS
HEIGHT: 70 IN | WEIGHT: 223.44 LBS | BODY MASS INDEX: 31.99 KG/M2 | SYSTOLIC BLOOD PRESSURE: 150 MMHG | RESPIRATION RATE: 18 BRPM | TEMPERATURE: 98 F | HEART RATE: 62 BPM | DIASTOLIC BLOOD PRESSURE: 82 MMHG | OXYGEN SATURATION: 99 %

## 2025-05-26 DIAGNOSIS — D50.9 IRON DEFICIENCY ANEMIA, UNSPECIFIED IRON DEFICIENCY ANEMIA TYPE: ICD-10-CM

## 2025-05-26 DIAGNOSIS — F17.210 SMOKING GREATER THAN 30 PACK YEARS: ICD-10-CM

## 2025-05-26 DIAGNOSIS — I10 HYPERTENSION, ESSENTIAL: Primary | ICD-10-CM

## 2025-05-26 DIAGNOSIS — E78.5 HYPERLIPIDEMIA, UNSPECIFIED HYPERLIPIDEMIA TYPE: ICD-10-CM

## 2025-05-26 PROCEDURE — 99999 PR PBB SHADOW E&M-EST. PATIENT-LVL IV: CPT | Mod: PBBFAC,,,

## 2025-05-26 PROCEDURE — 99214 OFFICE O/P EST MOD 30 MIN: CPT | Mod: S$PBB,,,

## 2025-05-26 PROCEDURE — 99214 OFFICE O/P EST MOD 30 MIN: CPT | Mod: PBBFAC,PN

## 2025-05-26 RX ORDER — FERROUS GLUCONATE 324(38)MG
324 TABLET ORAL
COMMUNITY

## 2025-05-27 NOTE — PROGRESS NOTES
Subjective:       Patient ID: Isai Mello is a 64 y.o. male.    Chief Complaint: Follow-up (3 week)    History of Present Illness    CHIEF COMPLAINT: Patient presents today for follow up of blood pressure.    HYPERTENSION: he was seen by Dr Oviedo on 5/1/25 and his blood pressure was high.  Norvasc 5 mg was added and he was continued on lisinopril 40 mg. He reports home blood pressure readings ranging in 130s-140s systolic with diastolic consistently in 70s, which he attributes to elevated anxiety. He continues Norvasc 5mg every morning and lisinopril.  His blood pressure is elevated today at 150/82 with recheck 162/82.  He denies chest pain or palpitations.  He states he had 2 cups of coffee prior to visit and feels this may be contributing to elevated reading.    In February 2022, he experienced an anxiety attack resulting in an ER visit with blood pressure recorded at 198/104. He reports occasional sleep disturbances recently due to anxiety and mental preoccupations.    HYPERLIPIDEMIA:His cholesterol has improved from 241 to 205, with LDL at 138. He has been actively modifying his diet, including reducing consumption of steak and fried foods when dining out.    IRON DEFICIENCY:He started iron supplementation last year as prescribed by Dr. Oviedo. He experienced initial GI side effects which have since resolved.    PENDING CARE:He has pending referrals to dermatology with Dr. Weil for actinic keratosis but has not heard from them to schedule.  Also had an LDCT ordered but no one has called him to schedule.               Review of patient's allergies indicates:   Allergen Reactions    Codeine     Sulfa (sulfonamide antibiotics)     Zonisamide      Social Drivers of Health     Tobacco Use: High Risk (5/26/2025)    Patient History     Smoking Tobacco Use: Every Day     Smokeless Tobacco Use: Never     Passive Exposure: Not on file   Alcohol Use: Not At Risk (2/22/2024)    AUDIT-C     Frequency of Alcohol  Consumption: Monthly or less     Average Number of Drinks: 1 or 2     Frequency of Binge Drinking: Never   Financial Resource Strain: Low Risk  (2/22/2024)    Overall Financial Resource Strain (CARDIA)     Difficulty of Paying Living Expenses: Not hard at all   Food Insecurity: No Food Insecurity (2/22/2024)    Hunger Vital Sign     Worried About Running Out of Food in the Last Year: Never true     Ran Out of Food in the Last Year: Never true   Transportation Needs: No Transportation Needs (2/22/2024)    PRAPARE - Transportation     Lack of Transportation (Medical): No     Lack of Transportation (Non-Medical): No   Physical Activity: Insufficiently Active (2/22/2024)    Exercise Vital Sign     Days of Exercise per Week: 3 days     Minutes of Exercise per Session: 30 min   Stress: No Stress Concern Present (2/22/2024)    [employer] - Occupational Stress Questionnaire     Feeling of Stress : Only a little   Housing Stability: Low Risk  (2/22/2024)    Housing Stability Vital Sign     Unable to Pay for Housing in the Last Year: No     Number of Places Lived in the Last Year: 1     Unstable Housing in the Last Year: No   Depression: Low Risk  (5/1/2025)    Depression     Last PHQ-4: Flowsheet Data: 0   Utilities: Not on file   Health Literacy: Not on file   Social Isolation: Not on file      Past Medical History:   Diagnosis Date    Hypertension       Past Surgical History:   Procedure Laterality Date    COLONOSCOPY  09/16/2024    Return in 10yrs per Dr. Roper    KNEE SURGERY      ULNAR NERVE TRANSPOSITION Left 03/21/2022    Procedure: TRANSPOSITION, NERVE, ULNAR;  Surgeon: Adama Mejia MD;  Location: [facility];  Service: Orthopedics;  Laterality: Left;      Social History[1]    Current Medications[2]    Lab Results   Component Value Date    WBC 5.9 01/28/2025    HGB 13.9 01/28/2025    HCT 42.2 01/28/2025     01/28/2025    CHOL 205 (H) 05/09/2025    TRIG 127 05/09/2025    HDL  42 05/09/2025    LDLDIRECT 161 02/24/2025    ALT 20 02/24/2025    AST 14 11/15/2024     02/24/2025    K 4.6 02/24/2025     02/24/2025    CREATININE 0.9 02/24/2025    BUN 14 02/24/2025    CO2 27 02/24/2025    TSH 0.88 03/11/2022    PSA 2.37 02/24/2025    HGBA1C 5.5 11/21/2023       Review of Systems   Constitutional: Negative.    HENT: Negative.     Respiratory: Negative.     Cardiovascular: Negative.    Gastrointestinal: Negative.    Neurological: Negative.    Psychiatric/Behavioral:  Positive for sleep disturbance. Negative for depressed mood, dysphoric mood and suicidal ideas. The patient is nervous/anxious.        Objective:      Physical Exam  Vitals reviewed.   Constitutional:       Appearance: Normal appearance.   Cardiovascular:      Rate and Rhythm: Normal rate and regular rhythm.      Pulses: Normal pulses.      Heart sounds: Normal heart sounds.   Pulmonary:      Effort: Pulmonary effort is normal.      Breath sounds: Normal breath sounds.   Skin:     General: Skin is warm and dry.      Capillary Refill: Capillary refill takes less than 2 seconds.   Neurological:      General: No focal deficit present.      Mental Status: He is alert.   Psychiatric:         Mood and Affect: Mood normal.         Behavior: Behavior normal.         Thought Content: Thought content normal.         Judgment: Judgment normal.         Assessment:       1. Hypertension, essential    2. Iron deficiency anemia, unspecified iron deficiency anemia type    3. Hyperlipidemia, unspecified hyperlipidemia type    4. Smoking greater than 30 pack years        Plan:       Isai was seen today for follow-up.    Diagnoses and all orders for this visit:    Hypertension, essential    Iron deficiency anemia, unspecified iron deficiency anemia type  -     Iron and TIBC; Future  -     Ferritin; Future  -     Iron and TIBC  -     Ferritin    Hyperlipidemia, unspecified hyperlipidemia type    Smoking greater than 30 pack  years    Hypertension  - readings still elevated today.  Patient reluctant to increase medication dose due to his consumption of caffeine today.    - he will keep a daily blood pressure log  - continue his norvasc 5 mg and lisinopril 40 mg daily  - blood pressure goal is < 140/90  - follow up in 3 weeks.  Bring blood pressure log to visit.     Hyperlipidemia  - improved  - I recommend a heart healthy diet rich in fiber, fresh vegetables and fruit and low in saturated fats (fried foods, red meat, etc.).  I also recommend regular exercise.       I forwarded his referral to Dr Weil and patient has phone number to call the office to schedule.  Check iron level. Continue iron supplement.  LDCT is ordered.  Follow up in 6 months or sooner if needed.               [1]   Social History  Socioeconomic History    Marital status:    [2]   Current Outpatient Medications:     amLODIPine (NORVASC) 5 MG tablet, Take 1 tablet (5 mg total) by mouth once daily., Disp: 30 tablet, Rfl: 2    co-enzyme Q-10 30 mg capsule, Take 30 mg by mouth once daily., Disp: , Rfl:     diclofenac sodium (VOLTAREN) 1 % Gel, APPLY 2 GRAMS TOPICALLY FOUR TIMES A DAY AS NEEDED FOR PAIN, Disp: , Rfl:     ferrous gluconate (FERGON) 324 MG tablet, Take 324 mg by mouth daily with breakfast., Disp: , Rfl:     fluticasone propionate (FLONASE) 50 mcg/actuation nasal spray, 1 spray by Each Nostril route once daily., Disp: , Rfl:     lisinopriL (PRINIVIL,ZESTRIL) 40 MG tablet, Take 1 tablet (40 mg total) by mouth once daily., Disp: 90 tablet, Rfl: 0    multivitamin (THERAGRAN) per tablet, Take 1 tablet by mouth once daily., Disp: , Rfl:     omega-3 fatty acids/fish oil (FISH OIL-OMEGA-3 FATTY ACIDS) 300-1,000 mg capsule, Take 1 capsule by mouth once daily., Disp: , Rfl:     pantoprazole (PROTONIX) 40 MG tablet, Take 1 tablet (40 mg total) by mouth once daily., Disp: 90 tablet, Rfl: 0    sildenafiL (VIAGRA) 100 MG tablet, Take 1 tablet (100 mg total) by  mouth daily as needed for Erectile Dysfunction., Disp: 30 tablet, Rfl: 5    vit A/vit C/vit E/zinc/copper (PRESERVISION AREDS ORAL), Take 1 tablet by mouth once daily., Disp: , Rfl:

## 2025-06-02 ENCOUNTER — HOSPITAL ENCOUNTER (OUTPATIENT)
Dept: RADIOLOGY | Facility: HOSPITAL | Age: 65
Discharge: HOME | End: 2025-06-02
Attending: FAMILY MEDICINE
Payer: OTHER GOVERNMENT

## 2025-06-02 DIAGNOSIS — F17.210 SMOKING GREATER THAN 30 PACK YEARS: ICD-10-CM

## 2025-06-02 PROCEDURE — 71271 CT THORAX LUNG CANCER SCR C-: CPT | Mod: 26,,, | Performed by: RADIOLOGY

## 2025-06-02 PROCEDURE — 71271 CT THORAX LUNG CANCER SCR C-: CPT | Mod: TC,PO

## 2025-06-05 ENCOUNTER — RESULTS FOLLOW-UP (OUTPATIENT)
Dept: FAMILY MEDICINE | Facility: CLINIC | Age: 65
End: 2025-06-05

## 2025-06-16 ENCOUNTER — OFFICE VISIT (OUTPATIENT)
Dept: FAMILY MEDICINE | Facility: CLINIC | Age: 65
End: 2025-06-16
Payer: OTHER GOVERNMENT

## 2025-06-16 VITALS
HEIGHT: 70 IN | BODY MASS INDEX: 32.16 KG/M2 | HEART RATE: 61 BPM | TEMPERATURE: 98 F | WEIGHT: 224.63 LBS | OXYGEN SATURATION: 97 % | DIASTOLIC BLOOD PRESSURE: 85 MMHG | RESPIRATION RATE: 18 BRPM | SYSTOLIC BLOOD PRESSURE: 166 MMHG

## 2025-06-16 DIAGNOSIS — I10 HYPERTENSION, ESSENTIAL: Primary | ICD-10-CM

## 2025-06-16 PROCEDURE — 99214 OFFICE O/P EST MOD 30 MIN: CPT | Mod: S$PBB,,,

## 2025-06-16 PROCEDURE — 99215 OFFICE O/P EST HI 40 MIN: CPT | Mod: PBBFAC,PN

## 2025-06-16 PROCEDURE — 99999 PR PBB SHADOW E&M-EST. PATIENT-LVL V: CPT | Mod: PBBFAC,,,

## 2025-06-16 RX ORDER — AMLODIPINE BESYLATE 10 MG/1
10 TABLET ORAL DAILY
Qty: 30 TABLET | Refills: 1 | Status: SHIPPED | OUTPATIENT
Start: 2025-06-16

## 2025-06-16 NOTE — PROGRESS NOTES
Subjective:       Patient ID: Isai Mello is a 64 y.o. male.    Chief Complaint: Follow-up (3 week)    History of Present Illness  CHIEF COMPLAINT:Patient presents today for follow up of blood pressure management    HYPERTENSION:He reports home blood pressure readings varying between 130s-160s, with workplace readings in 150s. He denies chest pain, palpitations, shortness of breath, light-headedness, or dizziness. He consumed three cups of caffeine earlier today.  His blood pressure in clinic is 160/88 with a recheck of 162/82.  His home machine reading is 166/85.  He denies chest pain or palpitations.      MEDICATIONS AND SUPPLEMENTS:  he is taking Norvasc 5 mg and lisinopril 40 mg daily.  He has been compliant with his medications.  He takes iron supplements which he now tolerates well after initial GI issues in the first three weeks. He also takes vitamins, fish oil, and eye vitamins as recommended by his eye doctor.    LABS/imaging:  Ferritin normal at 30.  Iron normal at 81, TIBC 377, iron saturation 21.  LDCT done June 2, 2025    Narrative & Impression  EXAMINATION:  CT CHEST LUNG SCREENING LOW DOSE     CLINICAL HISTORY:  Lung cancer screening, >= 30 pk-yr current smoker (Age 55-80y); Nicotine dependence, cigarettes, uncomplicated     TECHNIQUE:  CT of the thorax was performed with low dose, lung screening protocol.  No contrast was administered.  Sagittal and coronal reconstructions were obtained.     COMPARISON:  05/24/2024     FINDINGS:  Lungs: There are calcified granulomas bilaterally.  There are no noncalcified nodules infiltrates or pleural effusions.  The trachea and bronchi are normal.     Mediastinum: No hilar, mediastinal or axillary adenopathy.     Heart and pericardium: Normal size without effusion.     Aorta and vasculature: Atherosclerosis including coronary arteries.     Chest wall and skeletal structures: Unremarkable except age-appropriate degenerative changes.     Upper abdomen:  Unremarkable.     Impression:     Lung-RADS Category: 1 - Negative - Continue annual screening with LDCT in 12 months.     Clinically or potentially clinically significant non lung cancer finding:  None.     Prior Lung Cancer Modifier:  No history of prior lung cancer.        Electronically signed by:Ilene Quevedo  Date:                                            06/02/2025  Time:                                           16:36          Review of patient's allergies indicates:   Allergen Reactions    Codeine     Sulfa (sulfonamide antibiotics)     Zonisamide      Social Drivers of Health     Tobacco Use: High Risk (6/16/2025)    Patient History     Smoking Tobacco Use: Every Day     Smokeless Tobacco Use: Never     Passive Exposure: Not on file   Alcohol Use: Not At Risk (2/22/2024)    AUDIT-C     Frequency of Alcohol Consumption: Monthly or less     Average Number of Drinks: 1 or 2     Frequency of Binge Drinking: Never   Financial Resource Strain: Low Risk  (2/22/2024)    Overall Financial Resource Strain (CARDIA)     Difficulty of Paying Living Expenses: Not hard at all   Food Insecurity: No Food Insecurity (2/22/2024)    Hunger Vital Sign     Worried About Running Out of Food in the Last Year: Never true     Ran Out of Food in the Last Year: Never true   Transportation Needs: No Transportation Needs (2/22/2024)    PRAPARE - Transportation     Lack of Transportation (Medical): No     Lack of Transportation (Non-Medical): No   Physical Activity: Insufficiently Active (2/22/2024)    Exercise Vital Sign     Days of Exercise per Week: 3 days     Minutes of Exercise per Session: 30 min   Stress: No Stress Concern Present (2/22/2024)    [employer] - Occupational Stress Questionnaire     Feeling of Stress : Only a little   Housing Stability: Low Risk  (2/22/2024)    Housing Stability Vital Sign     Unable to Pay for Housing in the Last Year: No     Number of Places Lived in the Last Year:  1     Unstable Housing in the Last Year: No   Depression: Low Risk  (5/1/2025)    Depression     Last PHQ-4: Flowsheet Data: 0   Utilities: Not on file   Health Literacy: Not on file   Social Isolation: Not on file      Past Medical History:   Diagnosis Date    Hypertension       Past Surgical History:   Procedure Laterality Date    COLONOSCOPY  09/16/2024    Return in 10yrs per Dr. Roper    KNEE SURGERY      ULNAR NERVE TRANSPOSITION Left 03/21/2022    Procedure: TRANSPOSITION, NERVE, ULNAR;  Surgeon: Adama Mejia MD;  Location: [facility];  Service: Orthopedics;  Laterality: Left;      Social History[1]    Current Medications[2]    Lab Results   Component Value Date    WBC 5.9 01/28/2025    HGB 13.9 01/28/2025    HCT 42.2 01/28/2025     01/28/2025    CHOL 205 (H) 05/09/2025    TRIG 127 05/09/2025    HDL 42 05/09/2025    LDLDIRECT 161 02/24/2025    ALT 20 02/24/2025    AST 14 11/15/2024     02/24/2025    K 4.6 02/24/2025     02/24/2025    CREATININE 0.9 02/24/2025    BUN 14 02/24/2025    CO2 27 02/24/2025    TSH 0.88 03/11/2022    PSA 2.37 02/24/2025    HGBA1C 5.5 11/21/2023       Review of Systems   Constitutional: Negative.    Respiratory: Negative.     Cardiovascular: Negative.        Objective:      Physical Exam  Vitals reviewed.   Constitutional:       Appearance: Normal appearance.   Cardiovascular:      Rate and Rhythm: Normal rate and regular rhythm.      Pulses: Normal pulses.      Heart sounds: Normal heart sounds.   Pulmonary:      Effort: Pulmonary effort is normal.      Breath sounds: Normal breath sounds.   Neurological:      Mental Status: He is alert.   Psychiatric:         Mood and Affect: Mood normal.         Thought Content: Thought content normal.         Judgment: Judgment normal.         Assessment:       1. Hypertension, essential        Plan:       Isai was seen today for follow-up.    Diagnoses and all orders for this visit:    Hypertension, essential  -      amLODIPine (NORVASC) 10 MG tablet; Take 1 tablet (10 mg total) by mouth once daily.    Hypertension  - readings still elevated today.  - he will keep a daily blood pressure log  - increase amlodipine to 10 mg daily   - continue his lisinopril 40 mg daily  - blood pressure goal is < 140/90  - home blood pressure machine calibration verified.  Patient home machine almost identical to manual blood pressure in clinic.  - follow up in 3-4 weeks.  Bring blood pressure log to visit.         This note was generated with the assistance of ambient listening technology. Verbal consent was obtained by the patient and accompanying visitor(s) for the recording of patient appointment to facilitate this note. I attest to having reviewed and edited the generated note for accuracy, though some syntax or spelling errors may persist. Please contact the author of this note for any clarification.         [1]   Social History  Socioeconomic History    Marital status:    [2]   Current Outpatient Medications:     co-enzyme Q-10 30 mg capsule, Take 30 mg by mouth once daily., Disp: , Rfl:     diclofenac sodium (VOLTAREN) 1 % Gel, APPLY 2 GRAMS TOPICALLY FOUR TIMES A DAY AS NEEDED FOR PAIN, Disp: , Rfl:     ferrous gluconate (FERGON) 324 MG tablet, Take 324 mg by mouth daily with breakfast., Disp: , Rfl:     fluticasone propionate (FLONASE) 50 mcg/actuation nasal spray, 1 spray by Each Nostril route once daily., Disp: , Rfl:     lisinopriL (PRINIVIL,ZESTRIL) 40 MG tablet, Take 1 tablet (40 mg total) by mouth once daily., Disp: 90 tablet, Rfl: 0    multivitamin (THERAGRAN) per tablet, Take 1 tablet by mouth once daily., Disp: , Rfl:     omega-3 fatty acids/fish oil (FISH OIL-OMEGA-3 FATTY ACIDS) 300-1,000 mg capsule, Take 1 capsule by mouth once daily., Disp: , Rfl:     pantoprazole (PROTONIX) 40 MG tablet, Take 1 tablet (40 mg total) by mouth once daily., Disp: 90 tablet, Rfl: 0    sildenafiL (VIAGRA) 100 MG tablet, Take 1 tablet (100  mg total) by mouth daily as needed for Erectile Dysfunction., Disp: 30 tablet, Rfl: 5    vit A/vit C/vit E/zinc/copper (PRESERVISION AREDS ORAL), Take 1 tablet by mouth once daily., Disp: , Rfl:     amLODIPine (NORVASC) 10 MG tablet, Take 1 tablet (10 mg total) by mouth once daily., Disp: 30 tablet, Rfl: 1

## 2025-07-09 RX ORDER — LISINOPRIL 40 MG/1
40 TABLET ORAL
Qty: 90 TABLET | Refills: 2 | Status: SHIPPED | OUTPATIENT
Start: 2025-07-09

## 2025-07-09 RX ORDER — PANTOPRAZOLE SODIUM 40 MG/1
40 TABLET, DELAYED RELEASE ORAL
Qty: 90 TABLET | Refills: 3 | Status: SHIPPED | OUTPATIENT
Start: 2025-07-09

## 2025-07-09 NOTE — TELEPHONE ENCOUNTER
No care due was identified.  [facility] Embedded Care Due Messages. Reference number: [id number].   7/09/2025 5:22:12 AM CDT

## 2025-07-09 NOTE — TELEPHONE ENCOUNTER
Refill Routing Note   Medication(s) are not appropriate for processing by [facility] for the following reason(s):        Required vitals abnormal    [facility] action(s):  Defer  Approve             Appointments  past 12m or future 3m with PCP    Date Provider   Last Visit   5/1/2025 Brian Oviedo III, MD   Next Visit   Visit date not found Brian Oviedo III, MD   ED visits in past 90 days: 0        Note composed:12:22 PM 07/09/2025

## 2025-07-14 ENCOUNTER — OFFICE VISIT (OUTPATIENT)
Dept: FAMILY MEDICINE | Facility: CLINIC | Age: 65
End: 2025-07-14
Payer: OTHER GOVERNMENT

## 2025-07-14 VITALS
RESPIRATION RATE: 18 BRPM | OXYGEN SATURATION: 99 % | BODY MASS INDEX: 32.07 KG/M2 | TEMPERATURE: 98 F | HEART RATE: 65 BPM | WEIGHT: 224 LBS | HEIGHT: 70 IN

## 2025-07-14 DIAGNOSIS — I10 HYPERTENSION, ESSENTIAL: Primary | ICD-10-CM

## 2025-07-14 PROCEDURE — 99214 OFFICE O/P EST MOD 30 MIN: CPT | Mod: S$PBB,,,

## 2025-07-14 PROCEDURE — 99214 OFFICE O/P EST MOD 30 MIN: CPT | Mod: PBBFAC,PN

## 2025-07-14 PROCEDURE — 99999 PR PBB SHADOW E&M-EST. PATIENT-LVL IV: CPT | Mod: PBBFAC,,,

## 2025-07-14 RX ORDER — HYDROCHLOROTHIAZIDE 12.5 MG/1
12.5 TABLET ORAL DAILY
Qty: 30 TABLET | Refills: 1 | Status: SHIPPED | OUTPATIENT
Start: 2025-07-14 | End: 2026-07-14

## 2025-07-14 NOTE — PROGRESS NOTES
Subjective:       Patient ID: Isai Mello is a 65 y.o. male.    Chief Complaint: Follow-up (1 month)    History of Present Illness    CHIEF COMPLAINT:  Patient presents today for follow up of hypertension    HYPERTENSION:  Patient's blood pressure was elevated and last visit his amlodipine was increased to 10 mg daily. He was continued on lisinopril 40 mg daily.   He has been compliant with his medications.   He reports home blood pressure readings in the evening after morning medications ranging from 138/67 to 157/79, noting improvement in readings. He attributes recent elevated blood pressure to increased work-related stress from new presidential directives in the [employer] division, which he describes as challenging and emotionally demanding. He is currently providing care for his girlfriend who is unable to drive, further contributing to personal stress. He acknowledges these stressors are likely causing blood pressure spikes and recognizes the interconnected nature of his stress, lifestyle, and potential health risks.  His blood pressure today is 170/90 on arrival with recheck of 152/70.  He denies chest pain, palpitations, or shortness of breath.  He has his home blood pressure cuff which reading was nearly the same as our manual blood pressure.        Review of patient's allergies indicates:   Allergen Reactions    Codeine     Sulfa (sulfonamide antibiotics)     Zonisamide      Social Drivers of Health     Tobacco Use: High Risk (7/14/2025)    Patient History     Smoking Tobacco Use: Every Day     Smokeless Tobacco Use: Never     Passive Exposure: Not on file   Alcohol Use: Not At Risk (2/22/2024)    AUDIT-C     Frequency of Alcohol Consumption: Monthly or less     Average Number of Drinks: 1 or 2     Frequency of Binge Drinking: Never   Financial Resource Strain: Low Risk  (2/22/2024)    Overall Financial Resource Strain (CARDIA)     Difficulty of Paying Living Expenses: Not hard at all    Food Insecurity: No Food Insecurity (2/22/2024)    Hunger Vital Sign     Worried About Running Out of Food in the Last Year: Never true     Ran Out of Food in the Last Year: Never true   Transportation Needs: No Transportation Needs (2/22/2024)    PRAPARE - Transportation     Lack of Transportation (Medical): No     Lack of Transportation (Non-Medical): No   Physical Activity: Insufficiently Active (2/22/2024)    Exercise Vital Sign     Days of Exercise per Week: 3 days     Minutes of Exercise per Session: 30 min   Stress: No Stress Concern Present (2/22/2024)    [employer] [place] [employer] - Occupational Stress Questionnaire     Feeling of Stress : Only a little   Housing Stability: Low Risk  (2/22/2024)    Housing Stability Vital Sign     Unable to Pay for Housing in the Last Year: No     Number of Places Lived in the Last Year: 1     Unstable Housing in the Last Year: No   Depression: Low Risk  (5/1/2025)    Depression     Last PHQ-4: Flowsheet Data: 0   Utilities: Not on file   Health Literacy: Not on file   Social Isolation: Not on file      Past Medical History:   Diagnosis Date    Hypertension       Past Surgical History:   Procedure Laterality Date    COLONOSCOPY  09/16/2024    Return in 10yrs per Dr. Roper    KNEE SURGERY      ULNAR NERVE TRANSPOSITION Left 03/21/2022    Procedure: TRANSPOSITION, NERVE, ULNAR;  Surgeon: Adama Mejia MD;  [facility]: [facility];  Service: Orthopedics;  Laterality: Left;      Social History[1]    Current Medications[2]    Lab Results   Component Value Date    WBC 5.9 01/28/2025    HGB 13.9 01/28/2025    HCT 42.2 01/28/2025     01/28/2025    CHOL 205 (H) 05/09/2025    TRIG 127 05/09/2025    HDL 42 05/09/2025    LDLDIRECT 161 02/24/2025    ALT 20 02/24/2025    AST 14 11/15/2024     02/24/2025    K 4.6 02/24/2025     02/24/2025    CREATININE 0.9 02/24/2025    BUN 14 02/24/2025    CO2 27 02/24/2025    TSH 0.88 03/11/2022    PSA 2.37  02/24/2025    HGBA1C 5.5 11/21/2023       Review of Systems   Constitutional: Negative.    Respiratory:  Negative for chest tightness and shortness of breath.    Cardiovascular:  Negative for chest pain, palpitations and leg swelling.       Objective:      Physical Exam  Vitals reviewed.   Constitutional:       Appearance: Normal appearance.   Cardiovascular:      Rate and Rhythm: Normal rate and regular rhythm.      Pulses: Normal pulses.      Heart sounds: Normal heart sounds.   Pulmonary:      Effort: Pulmonary effort is normal.      Breath sounds: Normal breath sounds.   Skin:     General: Skin is warm and dry.   Neurological:      General: No focal deficit present.      Mental Status: He is alert.   Psychiatric:         Mood and Affect: Mood normal.         Thought Content: Thought content normal.         Judgment: Judgment normal.         Assessment:       1. Hypertension, essential        Plan:       Isai was seen today for follow-up.    Diagnoses and all orders for this visit:    Hypertension, essential  -     hydroCHLOROthiazide 12.5 MG Tab; Take 1 tablet (12.5 mg total) by mouth once daily.    Hypertension  - readings still elevated today.  - he will keep a daily blood pressure log  - add hctz 12.5 mg daily  - continue amlodipine to 10 mg daily   - continue his lisinopril 40 mg daily  - blood pressure goal is < 140/90  - home blood pressure machine calibration verified.  Patient home machine almost identical to manual blood pressure in clinic.  - follow up in 3-4 weeks.  Bring blood pressure log to visit.          This note was generated with the assistance of ambient listening technology. Verbal consent was obtained by the patient and accompanying visitor(s) for the recording of patient appointment to facilitate this note. I attest to having reviewed and edited the generated note for accuracy, though some syntax or spelling errors may persist. Please contact the author of this note for any  clarification.         [1]   Social History  Socioeconomic History    Marital status:    [2]   Current Outpatient Medications:     amLODIPine (NORVASC) 10 MG tablet, Take 1 tablet (10 mg total) by mouth once daily., Disp: 30 tablet, Rfl: 1    co-enzyme Q-10 30 mg capsule, Take 30 mg by mouth once daily., Disp: , Rfl:     diclofenac sodium (VOLTAREN) 1 % Gel, APPLY 2 GRAMS TOPICALLY FOUR TIMES A DAY AS NEEDED FOR PAIN, Disp: , Rfl:     ferrous gluconate (FERGON) 324 MG tablet, Take 324 mg by mouth daily with breakfast., Disp: , Rfl:     fluticasone propionate (FLONASE) 50 mcg/actuation nasal spray, 1 spray by Each Nostril route once daily., Disp: , Rfl:     lisinopriL (PRINIVIL,ZESTRIL) 40 MG tablet, TAKE 1 TABLET(40 MG) BY MOUTH DAILY, Disp: 90 tablet, Rfl: 2    multivitamin (THERAGRAN) per tablet, Take 1 tablet by mouth once daily., Disp: , Rfl:     omega-3 fatty acids/fish oil (FISH OIL-OMEGA-3 FATTY ACIDS) 300-1,000 mg capsule, Take 1 capsule by mouth once daily., Disp: , Rfl:     pantoprazole (PROTONIX) 40 MG tablet, TAKE 1 TABLET(40 MG) BY MOUTH DAILY, Disp: 90 tablet, Rfl: 3    sildenafiL (VIAGRA) 100 MG tablet, Take 1 tablet (100 mg total) by mouth daily as needed for Erectile Dysfunction., Disp: 30 tablet, Rfl: 5    vit A/vit C/vit E/zinc/copper (PRESERVISION AREDS ORAL), Take 1 tablet by mouth once daily., Disp: , Rfl:     hydroCHLOROthiazide 12.5 MG Tab, Take 1 tablet (12.5 mg total) by mouth once daily., Disp: 30 tablet, Rfl: 1

## 2025-08-12 ENCOUNTER — OFFICE VISIT (OUTPATIENT)
Dept: FAMILY MEDICINE | Facility: CLINIC | Age: 65
End: 2025-08-12
Payer: OTHER GOVERNMENT

## 2025-08-12 VITALS
TEMPERATURE: 98 F | HEART RATE: 76 BPM | SYSTOLIC BLOOD PRESSURE: 173 MMHG | BODY MASS INDEX: 32.48 KG/M2 | OXYGEN SATURATION: 98 % | WEIGHT: 226.88 LBS | RESPIRATION RATE: 18 BRPM | HEIGHT: 70 IN | DIASTOLIC BLOOD PRESSURE: 79 MMHG

## 2025-08-12 DIAGNOSIS — F17.210 SMOKING GREATER THAN 30 PACK YEARS: ICD-10-CM

## 2025-08-12 DIAGNOSIS — I10 HYPERTENSION, ESSENTIAL: Primary | ICD-10-CM

## 2025-08-12 DIAGNOSIS — E78.5 HYPERLIPIDEMIA, UNSPECIFIED HYPERLIPIDEMIA TYPE: ICD-10-CM

## 2025-08-12 DIAGNOSIS — Z01.00 ROUTINE EYE EXAM: ICD-10-CM

## 2025-08-12 PROCEDURE — 99999 PR PBB SHADOW E&M-EST. PATIENT-LVL V: CPT | Mod: PBBFAC,,,

## 2025-08-12 PROCEDURE — 99215 OFFICE O/P EST HI 40 MIN: CPT | Mod: PBBFAC,PN

## 2025-08-12 PROCEDURE — 99214 OFFICE O/P EST MOD 30 MIN: CPT | Mod: S$PBB,,,

## 2025-08-12 RX ORDER — HYDROCHLOROTHIAZIDE 25 MG/1
25 TABLET ORAL DAILY
Qty: 30 TABLET | Refills: 1 | Status: SHIPPED | OUTPATIENT
Start: 2025-08-12 | End: 2026-08-12

## 2025-08-12 RX ORDER — AMLODIPINE BESYLATE 10 MG/1
10 TABLET ORAL DAILY
Qty: 90 TABLET | Refills: 1 | Status: SHIPPED | OUTPATIENT
Start: 2025-08-12

## (undated) DEVICE — SEE MEDLINE ITEM 157171

## (undated) DEVICE — TOURNIQUET SB QC DP 18X4IN

## (undated) DEVICE — BLADE SURG CARBON STEEL #10

## (undated) DEVICE — PACK SET UP 190 OMC-NS

## (undated) DEVICE — TRAY DRY SPONGE SCRUB W FOAM

## (undated) DEVICE — SOL 9P NACL IRR PIC IL

## (undated) DEVICE — STRAP OR TABLE 5IN X 72IN

## (undated) DEVICE — INSTRUMENT FRAZIER 10FR W/VENT

## (undated) DEVICE — SLING ORTHOPEDIC LARGE

## (undated) DEVICE — SEE MEDLINE ITEM 157117

## (undated) DEVICE — SUT 3-0 VICRYL / SH (J416)

## (undated) DEVICE — PACK BASIC

## (undated) DEVICE — PAD CAST SPECIALIST STRL 3

## (undated) DEVICE — ELECTRODE ELECSURG NDL

## (undated) DEVICE — PAD CAST SPECIALIST STRL 4

## (undated) DEVICE — DRESSING GAUZE OIL EMUL 3X8

## (undated) DEVICE — COVER SURG LIGHT HANDLE

## (undated) DEVICE — BANDAGE ESMARK ELASTIC ST 4X9

## (undated) DEVICE — ELECTRODE REM PLYHSV RETURN 9

## (undated) DEVICE — GLOVE SURG ULTRA TOUCH 7

## (undated) DEVICE — SLEEVE SCD EXPRESS KNEE MEDIUM

## (undated) DEVICE — BRUSH SCRUB HIBICLENS 4%

## (undated) DEVICE — PENCIL ROCKER SWITCH 10FT CORD

## (undated) DEVICE — SUT ETHILON 4-0 PS2 18 BLK

## (undated) DEVICE — LOOP VESSEL YELLOW MAXI

## (undated) DEVICE — SPONGE BULKEE II ABSRB 6X6.75

## (undated) DEVICE — NDL SAFETY 25G X 1.5 ECLIPSE

## (undated) DEVICE — SUT CTD VICRYL 2.0

## (undated) DEVICE — TUBING SUC UNIV W/CONN 12FT

## (undated) DEVICE — DRAPE STERI U-SHAPED 47X51IN

## (undated) DEVICE — TOWEL OR DISP STRL BLUE 4/PK

## (undated) DEVICE — SUT STRATAFIX SPRL PS-2 3-0

## (undated) DEVICE — SEE MEDLINE ITEM 157131

## (undated) DEVICE — SYR 10CC LUER LOCK

## (undated) DEVICE — ADHESIVE DERMABOND ADVANCED

## (undated) DEVICE — SCRUB HIBICLENS 4% CHG 4OZ

## (undated) DEVICE — GLOVE SURG ULTRA TOUCH 8

## (undated) DEVICE — UNDERGLOVES BIOGEL PI SZ 7 LF

## (undated) DEVICE — APPLICATOR CHLORAPREP ORN 26ML